# Patient Record
Sex: FEMALE | Race: WHITE | NOT HISPANIC OR LATINO | Employment: FULL TIME | URBAN - METROPOLITAN AREA
[De-identification: names, ages, dates, MRNs, and addresses within clinical notes are randomized per-mention and may not be internally consistent; named-entity substitution may affect disease eponyms.]

---

## 2017-02-06 DIAGNOSIS — E03.9 HYPOTHYROIDISM, UNSPECIFIED TYPE: Primary | ICD-10-CM

## 2017-02-06 DIAGNOSIS — E78.49 OTHER HYPERLIPIDEMIA: ICD-10-CM

## 2017-02-06 LAB
ALBUMIN SERPL-MCNC: 4.7 G/DL (ref 3.5–5.2)
ALBUMIN/GLOB SERPL: 1.6 G/DL
ALP SERPL-CCNC: 97 U/L (ref 39–117)
ALT SERPL-CCNC: 19 U/L (ref 1–33)
AST SERPL-CCNC: 16 U/L (ref 1–32)
BILIRUB SERPL-MCNC: 0.3 MG/DL (ref 0.1–1.2)
BUN SERPL-MCNC: 11 MG/DL (ref 6–20)
BUN/CREAT SERPL: 13.8 (ref 7–25)
CALCIUM SERPL-MCNC: 9.6 MG/DL (ref 8.6–10.5)
CHLORIDE SERPL-SCNC: 101 MMOL/L (ref 98–107)
CHOLEST SERPL-MCNC: 218 MG/DL (ref 0–200)
CO2 SERPL-SCNC: 24.7 MMOL/L (ref 22–29)
CREAT SERPL-MCNC: 0.8 MG/DL (ref 0.57–1)
GLOBULIN SER CALC-MCNC: 3 GM/DL
GLUCOSE SERPL-MCNC: 99 MG/DL (ref 65–99)
HDLC SERPL-MCNC: 73 MG/DL (ref 40–60)
LDLC SERPL CALC-MCNC: 128 MG/DL (ref 0–100)
LDLC/HDLC SERPL: 1.76 {RATIO}
POTASSIUM SERPL-SCNC: 4.9 MMOL/L (ref 3.5–5.2)
PROT SERPL-MCNC: 7.7 G/DL (ref 6–8.5)
SODIUM SERPL-SCNC: 140 MMOL/L (ref 136–145)
TRIGL SERPL-MCNC: 84 MG/DL (ref 0–150)
TSH SERPL DL<=0.005 MIU/L-ACNC: 3.06 MIU/ML (ref 0.27–4.2)
VLDLC SERPL CALC-MCNC: 16.8 MG/DL (ref 5–40)

## 2017-02-13 ENCOUNTER — OFFICE VISIT (OUTPATIENT)
Dept: INTERNAL MEDICINE | Facility: CLINIC | Age: 47
End: 2017-02-13

## 2017-02-13 VITALS
BODY MASS INDEX: 32.03 KG/M2 | HEART RATE: 81 BPM | WEIGHT: 204.1 LBS | HEIGHT: 67 IN | OXYGEN SATURATION: 98 % | SYSTOLIC BLOOD PRESSURE: 112 MMHG | DIASTOLIC BLOOD PRESSURE: 78 MMHG

## 2017-02-13 DIAGNOSIS — E03.9 HYPOTHYROIDISM, UNSPECIFIED TYPE: ICD-10-CM

## 2017-02-13 DIAGNOSIS — Z00.00 HEALTH CARE MAINTENANCE: Primary | ICD-10-CM

## 2017-02-13 PROCEDURE — 93000 ELECTROCARDIOGRAM COMPLETE: CPT | Performed by: INTERNAL MEDICINE

## 2017-02-13 PROCEDURE — 99396 PREV VISIT EST AGE 40-64: CPT | Performed by: INTERNAL MEDICINE

## 2017-02-13 RX ORDER — LEVOTHYROXINE SODIUM 50 MCG
50 TABLET ORAL DAILY
Qty: 30 TABLET | Refills: 11 | Status: SHIPPED | OUTPATIENT
Start: 2017-02-13 | End: 2017-08-21 | Stop reason: SDUPTHER

## 2017-02-13 RX ORDER — ESCITALOPRAM OXALATE 10 MG/1
10 TABLET ORAL DAILY
Qty: 30 TABLET | Refills: 11 | Status: SHIPPED | OUTPATIENT
Start: 2017-02-13 | End: 2018-02-27

## 2017-02-13 NOTE — PROGRESS NOTES
Subjective   Rita Kemp is a 46 y.o. female and is here for a comprehensive physical exam. The patient reports problems - recent bout of bronchitis.  She is better with the bronchitis.  SHe does not use a reg inhaler  She rarely needs her rescue  Pt has been doing well with thyroid meds.  Taking as perscribed without any complications    Pt is UTD with annual gyn exam and mammo she folows with gyn    Do you take any herbs or supplements that were not prescribed by a doctor? Mvi, fish oil, biotin  Are you taking calcium supplements? Vit d    Social History: she does eat a healthy diet  She does exercise    Social History     Social History   • Marital status: Unknown     Spouse name: Suhas Kemp   • Number of children: 0   • Years of education: N/A     Occupational History   •  HR      Social History Main Topics   • Smoking status: Never Smoker   • Smokeless tobacco: Never Used   • Alcohol use 1.2 oz/week     2 Glasses of wine per week   • Drug use: No   • Sexual activity: Not on file     Other Topics Concern   • Not on file     Social History Narrative       Family History:   Family History   Problem Relation Age of Onset   • Coronary artery disease Mother    • Macular degeneration Mother    • Thyroid disease Mother    • Heart disease Mother    • Skin cancer Mother    • Coronary artery disease Father    • Hypertension Father    • Diabetes type II Father    • Heart disease Father    • Diabetes Maternal Grandmother    • Skin cancer Sister    • Alcohol abuse Sister      both sisters with hx of alcohol abuse   • Emphysema Brother         • Alcohol abuse Brother        Past Medical History:   Past Medical History   Diagnosis Date   • Anxiety    • Asthma    • Depression    • Diverticulitis    • History of normal mammogram 2015           Review of Systems    A comprehensive review of systems was negative.    Objective   Visit Vitals   • /78 (BP Location: Left arm, Patient  "Position: Sitting)   • Pulse 81   • Ht 66.5\" (168.9 cm)   • Wt 204 lb 1.6 oz (92.6 kg)   • SpO2 98%   • BMI 32.45 kg/m2       General Appearance:    Alert, cooperative, no distress, appears stated age   Head:    Normocephalic, without obvious abnormality, atraumatic   Eyes:    PERRL, conjunctiva/corneas clear, EOM's intact, fundi     benign, both eyes   Ears:    Normal TM's and external ear canals, both ears   Nose:   Nares normal, septum midline, mucosa normal, no drainage    or sinus tenderness   Throat:   Lips, mucosa, and tongue normal; teeth and gums normal   Neck:   Supple, symmetrical, trachea midline, no adenopathy;     thyroid:  no enlargement/tenderness/nodules; no carotid    bruit or JVD   Back:     Symmetric, no curvature, ROM normal, no CVA tenderness   Lungs:     Clear to auscultation bilaterally, respirations unlabored   Chest Wall:    No tenderness or deformity    Heart:    Regular rate and rhythm, S1 and S2 normal, no murmur, rub   or gallop       Abdomen:     Soft, non-tender, bowel sounds active all four quadrants,     no masses, no organomegaly           Extremities:   Extremities normal, atraumatic, no cyanosis or edema   Pulses:   2+ and symmetric all extremities   Skin:   Skin color, texture, turgor normal, no rashes or lesions   Lymph nodes:   Cervical, supraclavicular, and axillary nodes normal   Neurologic:   CNII-XII intact, normal strength, sensation and reflexes     throughout       Medications:   Current Outpatient Prescriptions:   •  albuterol (PROVENTIL HFA;VENTOLIN HFA) 108 (90 BASE) MCG/ACT inhaler, Inhale 2 puffs Every 4 (Four) Hours As Needed for wheezing., Disp: 6.7 g, Rfl: 0  •  ALPRAZolam (XANAX) 0.5 MG tablet, Take 0.5 mg by mouth 3 (three) times a day as needed.  , Disp: , Rfl:   •  escitalopram (LEXAPRO) 10 MG tablet, Take 1 tablet by mouth daily., Disp: , Rfl: 12  •  ESTROGEL 0.75 MG/1.25 GM (0.06%) topical gel, Apply 1 application topically daily., Disp: , Rfl: 1  •  " Multiple Vitamin (MULTI-VITAMIN) tablet, Take by mouth., Disp: , Rfl:   •  SYNTHROID 50 MCG tablet, TAKE 1 TABLET BY MOUTH EVERY DAY, Disp: 30 tablet, Rfl: 5     EKG shows normal sinus rhythm without any acute ST changes.  There is no baseline available for comparison.  She does have low voltage and some of the precordial leads likely related to breast tissue    Assessment/Plan   Healthy female exam.      1. Healthcare Maintenance:  Sees gyn  2. Patient Counseling:  --Nutrition: Stressed importance of moderation in sodium/caffeine intake, saturated fat and cholesterol, caloric balance, sufficient intake of fresh fruits, vegetables, fiber, calcium and vit D  --Exercise: Stressed the importance of regular exercise.   --Substance Abuse: Discussed cessation/primary prevention of tobacco, alcohol, or other drug use; driving or other dangerous activities under the influence; availability of treatment for abuse.   --Dental health: Discussed importance of regular tooth brushing, flossing, and dental visits.  --Immunizations reviewed.  --Discussed benefits of screening colonoscopy.  3. Hypothyroidism- she is stable on current

## 2017-02-17 ENCOUNTER — TELEPHONE (OUTPATIENT)
Dept: INTERNAL MEDICINE | Facility: CLINIC | Age: 47
End: 2017-02-17

## 2017-02-17 NOTE — TELEPHONE ENCOUNTER
I LEFT A MESSAGE THAT I COULDN'T LOCATE [facility] HERE IN THE OFFICE JUST FOR NAME BRAND SYNTHROID BUT I FOUND [facility] ON [address] AND ADVISED PT. TO GET ON THAT AND PRINT THOSE OFF.

## 2017-02-17 NOTE — TELEPHONE ENCOUNTER
----- Message from Lidia Bean sent at 2/14/2017 12:27 PM EST -----  Pt said Dr Pennington mentioned that we had a coupon card for 25.00 off of synthroid. Do we still have them and if so call her back and let her know. She works across the street.  Work #[phone]

## 2017-05-23 ENCOUNTER — TELEPHONE (OUTPATIENT)
Dept: INTERNAL MEDICINE | Facility: CLINIC | Age: 47
End: 2017-05-23

## 2017-06-09 ENCOUNTER — APPOINTMENT (OUTPATIENT)
Dept: WOMENS IMAGING | Facility: HOSPITAL | Age: 47
End: 2017-06-09

## 2017-06-09 PROCEDURE — 76641 ULTRASOUND BREAST COMPLETE: CPT | Performed by: RADIOLOGY

## 2017-06-09 PROCEDURE — 77062 BREAST TOMOSYNTHESIS BI: CPT | Performed by: RADIOLOGY

## 2017-06-09 PROCEDURE — G0204 DX MAMMO INCL CAD BI: HCPCS | Performed by: RADIOLOGY

## 2017-06-09 PROCEDURE — G0279 TOMOSYNTHESIS, MAMMO: HCPCS | Performed by: RADIOLOGY

## 2017-06-09 PROCEDURE — 77066 DX MAMMO INCL CAD BI: CPT | Performed by: RADIOLOGY

## 2017-08-21 ENCOUNTER — TELEPHONE (OUTPATIENT)
Dept: INTERNAL MEDICINE | Facility: CLINIC | Age: 47
End: 2017-08-21

## 2017-08-21 RX ORDER — LEVOTHYROXINE SODIUM 50 MCG
50 TABLET ORAL DAILY
Qty: 30 TABLET | Refills: 5 | Status: SHIPPED | OUTPATIENT
Start: 2017-08-21 | End: 2018-02-27 | Stop reason: SDUPTHER

## 2017-08-21 NOTE — TELEPHONE ENCOUNTER
RX SENT TO PHARMACY    ----- Message from Lidia Bean sent at 8/21/2017  9:15 AM EDT -----  Pt needs refill RX sent to [facility] for her Synthroid 50 mcg

## 2017-12-21 ENCOUNTER — OFFICE VISIT (OUTPATIENT)
Dept: INTERNAL MEDICINE | Facility: CLINIC | Age: 47
End: 2017-12-21

## 2017-12-21 VITALS
WEIGHT: 181.44 LBS | SYSTOLIC BLOOD PRESSURE: 100 MMHG | BODY MASS INDEX: 28.48 KG/M2 | HEART RATE: 62 BPM | HEIGHT: 67 IN | TEMPERATURE: 98.2 F | DIASTOLIC BLOOD PRESSURE: 68 MMHG | OXYGEN SATURATION: 97 %

## 2017-12-21 DIAGNOSIS — J40 BRONCHITIS: Primary | ICD-10-CM

## 2017-12-21 PROCEDURE — 99213 OFFICE O/P EST LOW 20 MIN: CPT | Performed by: NURSE PRACTITIONER

## 2017-12-21 RX ORDER — AMOXICILLIN 875 MG/1
875 TABLET, COATED ORAL EVERY 12 HOURS SCHEDULED
Qty: 20 TABLET | Refills: 0 | Status: SHIPPED | OUTPATIENT
Start: 2017-12-21 | End: 2018-02-27

## 2017-12-21 RX ORDER — ALBUTEROL SULFATE 90 UG/1
2 AEROSOL, METERED RESPIRATORY (INHALATION) EVERY 4 HOURS PRN
Qty: 6.7 G | Refills: 1 | Status: SHIPPED | OUTPATIENT
Start: 2017-12-21

## 2017-12-21 NOTE — PROGRESS NOTES
Subjective   Rita Kemp is a 47 y.o. female. Patient is here today for   Chief Complaint   Patient presents with   • URI     Pt complains of having a productive cough, yellow nasal/throat sputum & chest heaviness since she has asthma. x2 days. Pt has been using her albuterol inhaler & mucinex.    .    History of Present Illness   C/o cough associated with chest congestion. Cough is productive at times. Denies sore throat, fever. She has tried Mucinex and albuterol inhaler with minimal relief. She is feeling worse today. Patient has a history of asthma.     The following portions of the patient's history were reviewed and updated as appropriate: allergies, current medications, past family history, past medical history, past social history, past surgical history and problem list.    Review of Systems   Constitutional: Negative.    HENT: Positive for congestion. Negative for sinus pain, sinus pressure and sore throat.    Respiratory: Positive for cough and shortness of breath. Negative for wheezing.    Cardiovascular: Negative.        Objective   Vitals:    12/21/17 1107   BP: 100/68   Pulse: 62   Temp: 98.2 °F (36.8 °C)   SpO2: 97%     Physical Exam   Constitutional: Vital signs are normal. She appears well-developed and well-nourished.   HENT:   Right Ear: Tympanic membrane and ear canal normal.   Left Ear: Tympanic membrane is bulging. A middle ear effusion is present.   Mouth/Throat: Oropharynx is clear and moist and mucous membranes are normal.   Cardiovascular: Normal rate, regular rhythm and normal heart sounds.    Pulmonary/Chest: Effort normal and breath sounds normal.   Lymphadenopathy:     She has no cervical adenopathy.   Skin: Skin is warm, dry and intact.   Psychiatric: She has a normal mood and affect. Her speech is normal and behavior is normal. Thought content normal.   Nursing note and vitals reviewed.      Assessment/Plan   Rita was seen today for uri.    Diagnoses and all orders for this  visit:    Bronchitis  -     albuterol (PROVENTIL HFA;VENTOLIN HFA) 108 (90 Base) MCG/ACT inhaler; Inhale 2 puffs Every 4 (Four) Hours As Needed for Wheezing.  -     amoxicillin (AMOXIL) 875 MG tablet; Take 1 tablet by mouth Every 12 (Twelve) Hours.  -     HYDROcod Polst-CPM Polst ER (TUSSIONEX PENNKINETIC ER) 10-8 MG/5ML ER suspension; Take 5 mL by mouth Every 12 (Twelve) Hours As Needed for Cough.

## 2018-02-13 ENCOUNTER — RESULTS ENCOUNTER (OUTPATIENT)
Dept: INTERNAL MEDICINE | Facility: CLINIC | Age: 48
End: 2018-02-13

## 2018-02-13 DIAGNOSIS — E03.9 HYPOTHYROIDISM, UNSPECIFIED TYPE: ICD-10-CM

## 2018-02-13 DIAGNOSIS — Z00.00 HEALTH CARE MAINTENANCE: ICD-10-CM

## 2018-02-22 LAB
ALBUMIN SERPL-MCNC: 4.7 G/DL (ref 3.5–5.2)
ALBUMIN/GLOB SERPL: 1.7 G/DL
ALP SERPL-CCNC: 85 U/L (ref 39–117)
ALT SERPL-CCNC: 9 U/L (ref 1–33)
AST SERPL-CCNC: 13 U/L (ref 1–32)
BILIRUB SERPL-MCNC: 0.4 MG/DL (ref 0.1–1.2)
BUN SERPL-MCNC: 10 MG/DL (ref 6–20)
BUN/CREAT SERPL: 12.5 (ref 7–25)
CALCIUM SERPL-MCNC: 9.5 MG/DL (ref 8.6–10.5)
CHLORIDE SERPL-SCNC: 102 MMOL/L (ref 98–107)
CHOLEST SERPL-MCNC: 218 MG/DL (ref 0–200)
CO2 SERPL-SCNC: 28.4 MMOL/L (ref 22–29)
CREAT SERPL-MCNC: 0.8 MG/DL (ref 0.57–1)
GFR SERPLBLD CREATININE-BSD FMLA CKD-EPI: 76 ML/MIN/1.73
GFR SERPLBLD CREATININE-BSD FMLA CKD-EPI: 92 ML/MIN/1.73
GLOBULIN SER CALC-MCNC: 2.7 GM/DL
GLUCOSE SERPL-MCNC: 82 MG/DL (ref 65–99)
HDLC SERPL-MCNC: 81 MG/DL (ref 40–60)
LDLC SERPL CALC-MCNC: 112 MG/DL (ref 0–100)
LDLC/HDLC SERPL: 1.38 {RATIO}
POTASSIUM SERPL-SCNC: 4.5 MMOL/L (ref 3.5–5.2)
PROT SERPL-MCNC: 7.4 G/DL (ref 6–8.5)
SODIUM SERPL-SCNC: 138 MMOL/L (ref 136–145)
TRIGL SERPL-MCNC: 127 MG/DL (ref 0–150)
TSH SERPL DL<=0.005 MIU/L-ACNC: 2.33 MIU/ML (ref 0.27–4.2)
VLDLC SERPL CALC-MCNC: 25.4 MG/DL (ref 5–40)

## 2018-02-27 ENCOUNTER — OFFICE VISIT (OUTPATIENT)
Dept: INTERNAL MEDICINE | Facility: CLINIC | Age: 48
End: 2018-02-27

## 2018-02-27 VITALS
WEIGHT: 177 LBS | HEIGHT: 67 IN | BODY MASS INDEX: 27.78 KG/M2 | OXYGEN SATURATION: 98 % | HEART RATE: 64 BPM | TEMPERATURE: 98 F | SYSTOLIC BLOOD PRESSURE: 100 MMHG | DIASTOLIC BLOOD PRESSURE: 70 MMHG

## 2018-02-27 DIAGNOSIS — Z12.11 SCREENING FOR COLON CANCER: ICD-10-CM

## 2018-02-27 DIAGNOSIS — E03.9 HYPOTHYROIDISM, UNSPECIFIED TYPE: ICD-10-CM

## 2018-02-27 DIAGNOSIS — Z00.00 HEALTH CARE MAINTENANCE: Primary | ICD-10-CM

## 2018-02-27 PROCEDURE — 99396 PREV VISIT EST AGE 40-64: CPT | Performed by: INTERNAL MEDICINE

## 2018-02-27 RX ORDER — LEVOTHYROXINE SODIUM 50 MCG
50 TABLET ORAL DAILY
Qty: 30 TABLET | Refills: 11 | Status: SHIPPED | OUTPATIENT
Start: 2018-02-27 | End: 2019-02-28

## 2018-02-27 RX ORDER — ZOLPIDEM TARTRATE 10 MG/1
10 TABLET ORAL NIGHTLY PRN
Qty: 15 TABLET | Refills: 0 | Status: SHIPPED | OUTPATIENT
Start: 2018-02-27 | End: 2019-02-28

## 2018-02-27 NOTE — PROGRESS NOTES
"Subjective   Rita Kemp is a 47 y.o. female and is here for a comprehensive physical exam. The patient reports problems - hypothyroidism.  Pt has been doing well with thyroid meds.  Taking as perscribed without any complications    Pt is UTD with annual gyn exam and mammo she sees the gyn    Do you take any herbs or supplements that were not prescribed by a doctor?       Social History: She is going on a cruise in [place]  Social History     Social History   • Marital status: Unknown     Spouse name: Suhas Kemp   • Number of children: 0   • Years of education: N/A     Occupational History   •  HR      Social History Main Topics   • Smoking status: Never Smoker   • Smokeless tobacco: Never Used   • Alcohol use 1.2 oz/week     2 Glasses of wine per week   • Drug use: No   • Sexual activity: Not on file     Other Topics Concern   • Not on file     Social History Narrative       Family History:   Family History   Problem Relation Age of Onset   • Coronary artery disease Mother    • Macular degeneration Mother    • Thyroid disease Mother    • Heart disease Mother    • Skin cancer Mother    • Coronary artery disease Father    • Hypertension Father    • Diabetes type II Father    • Heart disease Father    • Diabetes Maternal Grandmother    • Skin cancer Sister    • Alcohol abuse Sister      both sisters with hx of alcohol abuse   • Emphysema Brother         • Alcohol abuse Brother        Past Medical History:   Past Medical History:   Diagnosis Date   • Anxiety    • Asthma    • Depression    • Diverticulitis    • History of normal mammogram 2015           Review of Systems    A comprehensive review of systems was negative.    Objective   /70 (BP Location: Left arm, Patient Position: Sitting, Cuff Size: Adult)  Pulse 64  Temp 98 °F (36.7 °C) (Oral)   Ht 168.9 cm (66.5\")  Wt 80.3 kg (177 lb)  SpO2 98%  BMI 28.14 kg/m2    General Appearance:    Alert, cooperative, no distress, " appears stated age   Head:    Normocephalic, without obvious abnormality, atraumatic   Eyes:    PERRL, conjunctiva/corneas clear, EOM's intact, fundi     benign, both eyes   Ears:    Normal TM's and external ear canals, both ears   Nose:   Nares normal, septum midline, mucosa normal, no drainage    or sinus tenderness   Throat:   Lips, mucosa, and tongue normal; teeth and gums normal   Neck:   Supple, symmetrical, trachea midline, no adenopathy;     thyroid:  no enlargement/tenderness/nodules; no carotid    bruit or JVD   Back:     Symmetric, no curvature, ROM normal, no CVA tenderness   Lungs:     Clear to auscultation bilaterally, respirations unlabored   Chest Wall:    No tenderness or deformity    Heart:    Regular rate and rhythm, S1 and S2 normal, no murmur, rub   or gallop       Abdomen:     Soft, non-tender, bowel sounds active all four quadrants,     no masses, no organomegaly           Extremities:   Extremities normal, atraumatic, no cyanosis or edema   Pulses:   2+ and symmetric all extremities   Skin:   Skin color, texture, turgor normal, no rashes or lesions   Lymph nodes:   Cervical, supraclavicular, and axillary nodes normal   Neurologic:   CNII-XII intact, normal strength, sensation and reflexes     throughout       Medications:   Current Outpatient Prescriptions:   •  albuterol (PROVENTIL HFA;VENTOLIN HFA) 108 (90 Base) MCG/ACT inhaler, Inhale 2 puffs Every 4 (Four) Hours As Needed for Wheezing., Disp: 6.7 g, Rfl: 1  •  ALPRAZolam (XANAX) 0.5 MG tablet, Take 0.5 mg by mouth 3 (three) times a day as needed.  , Disp: , Rfl:   •  ESTROGEL 0.75 MG/1.25 GM (0.06%) topical gel, Apply 1 application topically daily., Disp: , Rfl: 1  •  Multiple Vitamin (MULTI-VITAMIN) tablet, Take by mouth., Disp: , Rfl:   •  SYNTHROID 50 MCG tablet, Take 1 tablet by mouth Daily., Disp: 30 tablet, Rfl: 5       Assessment/Plan   Healthy female exam.      1. Healthcare Maintenance:  2. Patient Counseling:  --Nutrition:  Stressed importance of moderation in sodium/caffeine intake, saturated fat and cholesterol, caloric balance, sufficient intake of fresh fruits, vegetables, fiber, calcium and vit D  --Exercise: she does exercise reg and she needs to cont this  --Substance Abuse: no tob ;limited etoh  --Dental health: she does go to the dentist  --Immunizations reviewed.  --Discussed benefits of screening colonoscopy she is due a colon  3. Hypothyroidism-  She does well with current dose  4.  She is going on a trip to [place] and needs something for sleep

## 2018-04-04 ENCOUNTER — PREP FOR SURGERY (OUTPATIENT)
Dept: OTHER | Facility: HOSPITAL | Age: 48
End: 2018-04-04

## 2018-04-04 DIAGNOSIS — Z83.71 FH: COLON POLYPS: Primary | ICD-10-CM

## 2018-05-02 ENCOUNTER — OFFICE VISIT (OUTPATIENT)
Dept: GASTROENTEROLOGY | Facility: CLINIC | Age: 48
End: 2018-05-02

## 2018-05-02 VITALS
TEMPERATURE: 98.5 F | HEIGHT: 66 IN | SYSTOLIC BLOOD PRESSURE: 112 MMHG | WEIGHT: 184 LBS | BODY MASS INDEX: 29.57 KG/M2 | DIASTOLIC BLOOD PRESSURE: 64 MMHG

## 2018-05-02 DIAGNOSIS — Z12.11 ENCOUNTER FOR SCREENING FOR MALIGNANT NEOPLASM OF COLON: Primary | ICD-10-CM

## 2018-05-02 DIAGNOSIS — Z87.19 HISTORY OF DIVERTICULITIS: ICD-10-CM

## 2018-05-02 PROCEDURE — 99203 OFFICE O/P NEW LOW 30 MIN: CPT | Performed by: INTERNAL MEDICINE

## 2018-05-02 RX ORDER — ESTRADIOL 2 MG/1
2 TABLET ORAL DAILY
Refills: 12 | COMMUNITY
Start: 2018-04-19

## 2018-05-02 NOTE — PROGRESS NOTES
Chief Complaint   Patient presents with   • discuss colonoscopy re hx of diverticulitis     Rita Kmep is a 48 y.o. female who presents with history of recurrent diverticulitis.  First episode 2006 - had normal c/s thereafter.  She has had several episodes thereafter.  She has been doing better in recent years - exercising, gluten and sugar free and avoids caffeine and she has not had a bout since.     The pain is awful when it occurs and is in her llq.    She was constipated but this has also improved with diet and exercise and she takes a probiotic.    HPI  Past Medical History:   Diagnosis Date   • Anxiety    • Asthma    • Depression    • Diverticulitis    • History of normal mammogram 07/2015     Past Surgical History:   Procedure Laterality Date   • COLONOSCOPY  10/24/2006    diverticulitis   [facility]   • HYSTERECTOMY  2009   • LEG SURGERY         Current Outpatient Prescriptions:   •  albuterol (PROVENTIL HFA;VENTOLIN HFA) 108 (90 Base) MCG/ACT inhaler, Inhale 2 puffs Every 4 (Four) Hours As Needed for Wheezing., Disp: 6.7 g, Rfl: 1  •  ALPRAZolam (XANAX) 0.5 MG tablet, Take 0.5 mg by mouth 3 (three) times a day as needed.  , Disp: , Rfl:   •  estradiol (ESTRACE) 2 MG tablet, Take 2 mg by mouth Daily., Disp: , Rfl: 12  •  Multiple Vitamin (MULTI-VITAMIN) tablet, Take by mouth., Disp: , Rfl:   •  SYNTHROID 50 MCG tablet, Take 1 tablet by mouth Daily., Disp: 30 tablet, Rfl: 11  •  ESTROGEL 0.75 MG/1.25 GM (0.06%) topical gel, Apply 1 application topically daily., Disp: , Rfl: 1  •  zolpidem (AMBIEN) 10 MG tablet, Take 1 tablet by mouth At Night As Needed for Sleep., Disp: 15 tablet, Rfl: 0  No Known Allergies  Social History     Social History   • Marital status: Unknown     Spouse name: Suhas Kemp   • Number of children: 0   • Years of education: N/A     Occupational History   •  HR      Social History Main Topics   • Smoking status: Never Smoker   • Smokeless tobacco: Never  Used   • Alcohol use 1.2 oz/week     2 Glasses of wine per week   • Drug use: No   • Sexual activity: Not on file     Other Topics Concern   • Not on file     Social History Narrative   • No narrative on file     Family History   Problem Relation Age of Onset   • Coronary artery disease Mother    • Macular degeneration Mother    • Thyroid disease Mother    • Heart disease Mother    • Skin cancer Mother    • Coronary artery disease Father    • Hypertension Father    • Diabetes type II Father    • Heart disease Father    • Diabetes Maternal Grandmother    • Skin cancer Sister    • Alcohol abuse Sister      both sisters with hx of alcohol abuse   • Emphysema Brother         • Alcohol abuse Brother      Review of Systems   Constitutional: Negative for appetite change and unexpected weight change.   Gastrointestinal: Negative for abdominal pain, blood in stool and constipation.   All other systems reviewed and are negative.    Vitals:    18 1510   BP: 112/64   Temp: 98.5 °F (36.9 °C)     1    18  1510   Weight: 83.5 kg (184 lb)     Physical Exam   Constitutional: She appears well-developed and well-nourished.   HENT:   Head: Normocephalic and atraumatic.   Eyes: No scleral icterus.   Abdominal: Soft. She exhibits no distension and no mass. There is no tenderness.   Neurological: She is alert.   Skin: Skin is warm and dry.   Psychiatric: She has a normal mood and affect.     No images are attached to the encounter.  No notes on file  Rita was seen today for discuss colonoscopy re hx of diverticulitis.    Diagnoses and all orders for this visit:    Encounter for screening for malignant neoplasm of colon  -     Case Request; Standing  -     Case Request    History of diverticulitis    Other orders  -     Implement Anesthesia orders day of procedure.; Standing  -     Obtain informed consent; Standing  -     Verify bowel prep was successful; Standing  -     Give tap water enema if bowel prep was  insufficient; Standing             Plan:  - fiber supplement daily  - continue diet modification and exercise  - due for screening c/s

## 2018-07-30 ENCOUNTER — APPOINTMENT (OUTPATIENT)
Dept: WOMENS IMAGING | Facility: HOSPITAL | Age: 48
End: 2018-07-30

## 2018-07-30 PROCEDURE — 77067 SCR MAMMO BI INCL CAD: CPT | Performed by: RADIOLOGY

## 2018-07-30 PROCEDURE — 77063 BREAST TOMOSYNTHESIS BI: CPT | Performed by: RADIOLOGY

## 2018-08-06 ENCOUNTER — ANESTHESIA EVENT (OUTPATIENT)
Dept: GASTROENTEROLOGY | Facility: HOSPITAL | Age: 48
End: 2018-08-06

## 2018-08-06 ENCOUNTER — HOSPITAL ENCOUNTER (OUTPATIENT)
Facility: HOSPITAL | Age: 48
Setting detail: HOSPITAL OUTPATIENT SURGERY
Discharge: HOME | End: 2018-08-06
Attending: INTERNAL MEDICINE | Admitting: INTERNAL MEDICINE

## 2018-08-06 ENCOUNTER — ANESTHESIA (OUTPATIENT)
Dept: GASTROENTEROLOGY | Facility: HOSPITAL | Age: 48
End: 2018-08-06

## 2018-08-06 VITALS
WEIGHT: 180.13 LBS | BODY MASS INDEX: 28.95 KG/M2 | SYSTOLIC BLOOD PRESSURE: 106 MMHG | RESPIRATION RATE: 16 BRPM | DIASTOLIC BLOOD PRESSURE: 52 MMHG | OXYGEN SATURATION: 98 % | TEMPERATURE: 97.8 F | HEART RATE: 57 BPM | HEIGHT: 66 IN

## 2018-08-06 PROCEDURE — 25010000002 PROPOFOL 10 MG/ML EMULSION: Performed by: NURSE ANESTHETIST, CERTIFIED REGISTERED

## 2018-08-06 PROCEDURE — 45378 DIAGNOSTIC COLONOSCOPY: CPT | Performed by: INTERNAL MEDICINE

## 2018-08-06 PROCEDURE — S0260 H&P FOR SURGERY: HCPCS | Performed by: INTERNAL MEDICINE

## 2018-08-06 RX ORDER — SODIUM CHLORIDE, SODIUM LACTATE, POTASSIUM CHLORIDE, CALCIUM CHLORIDE 600; 310; 30; 20 MG/100ML; MG/100ML; MG/100ML; MG/100ML
30 INJECTION, SOLUTION INTRAVENOUS CONTINUOUS PRN
Status: DISCONTINUED | OUTPATIENT
Start: 2018-08-06 | End: 2018-08-06 | Stop reason: HOSPADM

## 2018-08-06 RX ORDER — PROPOFOL 10 MG/ML
VIAL (ML) INTRAVENOUS CONTINUOUS PRN
Status: DISCONTINUED | OUTPATIENT
Start: 2018-08-06 | End: 2018-08-06 | Stop reason: SURG

## 2018-08-06 RX ORDER — LIDOCAINE HYDROCHLORIDE 20 MG/ML
INJECTION, SOLUTION INFILTRATION; PERINEURAL AS NEEDED
Status: DISCONTINUED | OUTPATIENT
Start: 2018-08-06 | End: 2018-08-06 | Stop reason: SURG

## 2018-08-06 RX ADMIN — LIDOCAINE HYDROCHLORIDE 100 MG: 20 INJECTION, SOLUTION INFILTRATION; PERINEURAL at 13:44

## 2018-08-06 RX ADMIN — SODIUM CHLORIDE, POTASSIUM CHLORIDE, SODIUM LACTATE AND CALCIUM CHLORIDE: 600; 310; 30; 20 INJECTION, SOLUTION INTRAVENOUS at 13:44

## 2018-08-06 RX ADMIN — SODIUM CHLORIDE, POTASSIUM CHLORIDE, SODIUM LACTATE AND CALCIUM CHLORIDE 30 ML/HR: 600; 310; 30; 20 INJECTION, SOLUTION INTRAVENOUS at 13:20

## 2018-08-06 RX ADMIN — PROPOFOL 300 MCG/KG/MIN: 10 INJECTION, EMULSION INTRAVENOUS at 13:44

## 2018-08-06 NOTE — H&P
[facility]  Pre Procedure History & Physical    Chief Complaint:   screening    Subjective     HPI:   Rita Kemp is a 48 y.o. female who presents with history of recurrent diverticulitis.  First episode  - had normal c/s thereafter.  She has had several episodes thereafter.  She has been doing better in recent years - exercising, gluten and sugar free and avoids caffeine and she has not had a bout since.      The pain is awful when it occurs and is in her llq.     She was constipated but this has also improved with diet and exercise and she takes a probiotic.      Past Medical History:   Past Medical History:   Diagnosis Date   • Anxiety    • Asthma    • Depression    • Disease of thyroid gland    • Diverticulitis    • History of normal mammogram 2015       Past Surgical History:  Past Surgical History:   Procedure Laterality Date   • COLONOSCOPY  10/24/2006    diverticulitis   [facility]VAR[facility]   • HYSTERECTOMY     • LEG SURGERY         Family History:  Family History   Problem Relation Age of Onset   • Coronary artery disease Mother    • Macular degeneration Mother    • Thyroid disease Mother    • Heart disease Mother    • Skin cancer Mother    • Coronary artery disease Father    • Hypertension Father    • Diabetes type II Father    • Heart disease Father    • Diabetes Maternal Grandmother    • Skin cancer Sister    • Alcohol abuse Sister         both sisters with hx of alcohol abuse   • Emphysema Brother            • Alcohol abuse Brother        Social History:   reports that she has never smoked. She has never used smokeless tobacco. She reports that she drinks about 1.2 oz of alcohol per week . She reports that she does not use drugs.    Medications:   Prescriptions Prior to Admission   Medication Sig Dispense Refill Last Dose   • estradiol (ESTRACE) 2 MG tablet Take 2 mg by mouth Daily.  12 Past Week at Unknown time   • ESTROGEL 0.75 MG/1.25 GM (0.06%) topical  "gel Apply 1 application topically daily.  1 Past Week at Unknown time   • Multiple Vitamin (MULTI-VITAMIN) tablet Take by mouth.   Past Week at Unknown time   • SYNTHROID 50 MCG tablet Take 1 tablet by mouth Daily. 30 tablet 11 Past Week at Unknown time   • zolpidem (AMBIEN) 10 MG tablet Take 1 tablet by mouth At Night As Needed for Sleep. 15 tablet 0 Past Month at Unknown time   • albuterol (PROVENTIL HFA;VENTOLIN HFA) 108 (90 Base) MCG/ACT inhaler Inhale 2 puffs Every 4 (Four) Hours As Needed for Wheezing. 6.7 g 1 More than a month at Unknown time   • ALPRAZolam (XANAX) 0.5 MG tablet Take 0.5 mg by mouth 3 (three) times a day as needed.     More than a month at Unknown time       Allergies:  Patient has no known allergies.    ROS:    Pertinent items are noted in HPI, all other systems reviewed and negative     Objective     Blood pressure 131/74, pulse 71, temperature 97.7 °F (36.5 °C), temperature source Oral, resp. rate 15, height 167.6 cm (66\"), weight 81.7 kg (180 lb 2 oz), SpO2 98 %.    Physical Exam   Constitutional: Pt is oriented to person, place, and time and well-developed, well-nourished, and in no distress.   Mouth/Throat: Oropharynx is clear and moist.   Neck: Normal range of motion.   Cardiovascular: Normal rate, regular rhythm   Pulmonary/Chest: Effort normal   Abdominal: Soft. Nontender  Skin: Skin is warm and dry.   Psychiatric: Mood, memory, affect and judgment normal.     Assessment/Plan     Diagnosis:  screening    Anticipated Surgical Procedure:  colonoscopy    The risks, benefits, and alternatives of this procedure have been discussed with the patient or the responsible party- the patient understands and agrees to proceed.                                                            "

## 2018-08-06 NOTE — ANESTHESIA PREPROCEDURE EVALUATION
Anesthesia Evaluation     Patient summary reviewed and Nursing notes reviewed   NPO Solid Status: > 8 hours  NPO Liquid Status: > 8 hours           Airway   Mallampati: III  TM distance: >3 FB  Neck ROM: full  no difficulty expected  Dental - normal exam     Pulmonary - normal exam   (+) asthma,   (-) COPD, not a smoker, lung cancer  Cardiovascular - normal exam  Exercise tolerance: excellent (>7 METS)    Rhythm: regular  Rate: normal    (+) hyperlipidemia,   (-) hypertension, valvular problems/murmurs, past MI, CAD, cardiac stents, CABG      Neuro/Psych  (+) psychiatric history Depression and Anxiety,     (-) seizures, TIA, CVA  GI/Hepatic/Renal/Endo    (+) obesity, morbid obesity,    (-) hepatitis, liver disease, no renal disease, diabetes, hypothyroidism    Musculoskeletal (-) negative ROS    Abdominal  - normal exam   Substance History - negative use     OB/GYN negative ob/gyn ROS         Other - negative ROS                     Anesthesia Plan    ASA 2     MAC     intravenous induction   Anesthetic plan and risks discussed with patient and spouse/significant other.    Plan discussed with CRNA and attending.

## 2018-08-06 NOTE — ANESTHESIA POSTPROCEDURE EVALUATION
"Patient: Rita Kemp    Procedure Summary     Date:  08/06/18 Room / Location:   [facility]    Anesthesia Start:  1344 Anesthesia Stop:  1414    Procedure:  COLONOSCOPYto cecum and ti (N/A ) Diagnosis:       Encounter for screening for malignant neoplasm of colon      (Encounter for screening for malignant neoplasm of colon [Z12.11])    Surgeon:  Gisela Ward MD Provider:  Gregg Antony MD    Anesthesia Type:  MAC ASA Status:  2          Anesthesia Type: MAC  Last vitals  BP   131/74 (08/06/18 1310)   Temp   36.5 °C (97.7 °F) (08/06/18 1310)   Pulse   71 (08/06/18 1310)   Resp   15 (08/06/18 1310)     SpO2   98 % (08/06/18 1310)     Post Anesthesia Care and Evaluation    Patient location during evaluation: bedside  Patient participation: complete - patient participated  Level of consciousness: awake and alert  Pain score: 0  Pain management: adequate  Airway patency: patent  Anesthetic complications: No anesthetic complications    Cardiovascular status: acceptable  Respiratory status: acceptable  Hydration status: acceptable    Comments: /74 (BP Location: Left arm, Patient Position: Lying)   Pulse 71   Temp 36.5 °C (97.7 °F) (Oral)   Resp 15   Ht 167.6 cm (66\")   Wt 81.7 kg (180 lb 2 oz)   SpO2 98%   BMI 29.07 kg/m²       "

## 2018-08-27 ENCOUNTER — RESULTS ENCOUNTER (OUTPATIENT)
Dept: INTERNAL MEDICINE | Facility: CLINIC | Age: 48
End: 2018-08-27

## 2018-08-27 DIAGNOSIS — Z00.00 HEALTH CARE MAINTENANCE: ICD-10-CM

## 2019-02-22 LAB
ALBUMIN SERPL-MCNC: 4.5 G/DL (ref 3.5–5.5)
ALBUMIN/GLOB SERPL: 1.6 {RATIO} (ref 1.2–2.2)
ALP SERPL-CCNC: 69 IU/L (ref 39–117)
ALT SERPL-CCNC: 17 IU/L (ref 0–32)
AST SERPL-CCNC: 16 IU/L (ref 0–40)
BILIRUB SERPL-MCNC: 0.4 MG/DL (ref 0–1.2)
BUN SERPL-MCNC: 11 MG/DL (ref 6–24)
BUN/CREAT SERPL: 14 (ref 9–23)
CALCIUM SERPL-MCNC: 9.2 MG/DL (ref 8.7–10.2)
CHLORIDE SERPL-SCNC: 101 MMOL/L (ref 96–106)
CHOLEST SERPL-MCNC: 204 MG/DL (ref 100–199)
CO2 SERPL-SCNC: 23 MMOL/L (ref 20–29)
CREAT SERPL-MCNC: 0.78 MG/DL (ref 0.57–1)
GLOBULIN SER CALC-MCNC: 2.8 G/DL (ref 1.5–4.5)
GLUCOSE SERPL-MCNC: 88 MG/DL (ref 65–99)
HDLC SERPL-MCNC: 81 MG/DL
LDLC SERPL CALC-MCNC: 96 MG/DL (ref 0–99)
LDLC/HDLC SERPL: 1.2 RATIO (ref 0–3.2)
POTASSIUM SERPL-SCNC: 4.8 MMOL/L (ref 3.5–5.2)
PROT SERPL-MCNC: 7.3 G/DL (ref 6–8.5)
SODIUM SERPL-SCNC: 137 MMOL/L (ref 134–144)
TRIGL SERPL-MCNC: 133 MG/DL (ref 0–149)
TSH SERPL DL<=0.005 MIU/L-ACNC: 2.51 UIU/ML (ref 0.45–4.5)
VLDLC SERPL CALC-MCNC: 27 MG/DL (ref 5–40)

## 2019-02-28 ENCOUNTER — OFFICE VISIT (OUTPATIENT)
Dept: INTERNAL MEDICINE | Facility: CLINIC | Age: 49
End: 2019-02-28

## 2019-02-28 VITALS
HEIGHT: 66 IN | SYSTOLIC BLOOD PRESSURE: 102 MMHG | HEART RATE: 70 BPM | OXYGEN SATURATION: 98 % | DIASTOLIC BLOOD PRESSURE: 70 MMHG | WEIGHT: 193.2 LBS | TEMPERATURE: 98.1 F | BODY MASS INDEX: 31.05 KG/M2

## 2019-02-28 DIAGNOSIS — N76.0 ACUTE VAGINITIS: ICD-10-CM

## 2019-02-28 DIAGNOSIS — E78.5 HYPERLIPIDEMIA, UNSPECIFIED HYPERLIPIDEMIA TYPE: ICD-10-CM

## 2019-02-28 DIAGNOSIS — E03.9 HYPOTHYROIDISM, UNSPECIFIED TYPE: ICD-10-CM

## 2019-02-28 DIAGNOSIS — Z00.00 HEALTH CARE MAINTENANCE: Primary | ICD-10-CM

## 2019-02-28 PROCEDURE — 99396 PREV VISIT EST AGE 40-64: CPT | Performed by: INTERNAL MEDICINE

## 2019-02-28 RX ORDER — LEVOTHYROXINE SODIUM 50 UG/1
50 CAPSULE ORAL DAILY
Qty: 30 CAPSULE | Refills: 3 | Status: SHIPPED | OUTPATIENT
Start: 2019-02-28 | End: 2019-03-12 | Stop reason: ALTCHOICE

## 2019-02-28 RX ORDER — FLUCONAZOLE 150 MG/1
150 TABLET ORAL ONCE
Qty: 1 TABLET | Refills: 1 | Status: SHIPPED | OUTPATIENT
Start: 2019-02-28 | End: 2019-02-28

## 2019-02-28 NOTE — PROGRESS NOTES
Subjective   Rita Kemp is a 48 y.o. female and is here for a comprehensive physical exam. The patient reports problems - hypothyroidism.  Pt has been doing well with thyroid meds.  Taking as perscribed without any complications  She does wake up tied but does not snore.  She is tired a lot    She has been having a little bit of itching vag and white d/c      Pt is UTD with annual gyn exam and mammo s/p hysterectomy    Do you take any herbs or supplements that were not prescribed by a doctor? See list      Social History: she has had limited exercise due to some back pain that is getting better.    Social History     Socioeconomic History   • Marital status: Unknown     Spouse name: Suhas Kemp   • Number of children: 0   • Years of education: Not on file   • Highest education level: Not on file   Social Needs   • Financial resource strain: Not on file   • Food insecurity - worry: Not on file   • Food insecurity - inability: Not on file   • Transportation needs - medical: Not on file   • Transportation needs - non-medical: Not on file   Occupational History   • Occupation:  HR   Tobacco Use   • Smoking status: Never Smoker   • Smokeless tobacco: Never Used   Substance and Sexual Activity   • Alcohol use: Yes     Alcohol/week: 1.2 oz     Types: 2 Glasses of wine per week   • Drug use: No   • Sexual activity: Not on file   Other Topics Concern   • Not on file   Social History Narrative   • Not on file       Family History:   Family History   Problem Relation Age of Onset   • Coronary artery disease Mother    • Macular degeneration Mother    • Thyroid disease Mother    • Heart disease Mother    • Skin cancer Mother    • Coronary artery disease Father    • Hypertension Father    • Diabetes type II Father    • Heart disease Father    • Diabetes Maternal Grandmother    • Skin cancer Sister    • Alcohol abuse Sister         both sisters with hx of alcohol abuse   • Emphysema Brother           "  • Alcohol abuse Brother        Past Medical History:   Past Medical History:   Diagnosis Date   • Anxiety    • Asthma    • Depression    • Disease of thyroid gland    • Diverticulitis    • History of normal mammogram 07/2015           Review of Systems    A comprehensive review of systems was negative.    Objective   /70 (BP Location: Left arm, Patient Position: Sitting, Cuff Size: Adult)   Pulse 70   Temp 98.1 °F (36.7 °C) (Oral)   Ht 167.6 cm (66\")   Wt 87.6 kg (193 lb 3.2 oz)   SpO2 98%   BMI 31.18 kg/m²     General Appearance:    Alert, cooperative, no distress, appears stated age   Head:    Normocephalic, without obvious abnormality, atraumatic   Eyes:    PERRL, conjunctiva/corneas clear, EOM's intact, fundi     benign, both eyes   Ears:    Normal TM's and external ear canals, both ears   Nose:   Nares normal, septum midline, mucosa normal, no drainage    or sinus tenderness   Throat:   Lips, mucosa, and tongue normal; teeth and gums normal   Neck:   Supple, symmetrical, trachea midline, no adenopathy;     thyroid:  no enlargement/tenderness/nodules; no carotid    bruit or JVD   Back:     Symmetric, no curvature, ROM normal, no CVA tenderness   Lungs:     Clear to auscultation bilaterally, respirations unlabored   Chest Wall:    No tenderness or deformity    Heart:    Regular rate and rhythm, S1 and S2 normal, no murmur, rub   or gallop       Abdomen:     Soft, non-tender, bowel sounds active all four quadrants,     no masses, no organomegaly      - White adherent discharge to the vaginal wall       Extremities:   Extremities normal, atraumatic, no cyanosis or edema   Pulses:   2+ and symmetric all extremities   Skin:   Skin color, texture, turgor normal, no rashes or lesions   Lymph nodes:   Cervical, supraclavicular, and axillary nodes normal   Neurologic:   CNII-XII intact, normal strength, sensation and reflexes     throughout       Medications:   Current Outpatient Medications:   •  " ALPRAZolam (XANAX) 0.5 MG tablet, Take 0.5 mg by mouth 3 (three) times a day as needed.  , Disp: , Rfl:   •  Ascorbic Acid (VITAMIN C PO), Take  by mouth., Disp: , Rfl:   •  Cholecalciferol (VITAMIN D PO), Take 5,000 Units by mouth., Disp: , Rfl:   •  estradiol (ESTRACE) 2 MG tablet, Take 2 mg by mouth Daily., Disp: , Rfl: 12  •  Multiple Vitamin (MULTI-VITAMIN) tablet, Take by mouth., Disp: , Rfl:   •  Omega-3 Fatty Acids (OMEGA 3 PO), Take  by mouth., Disp: , Rfl:   •  Probiotic Product (PROBIOTIC PO), Take  by mouth., Disp: , Rfl:   •  SYNTHROID 50 MCG tablet, Take 1 tablet by mouth Daily., Disp: 30 tablet, Rfl: 11  •  albuterol (PROVENTIL HFA;VENTOLIN HFA) 108 (90 Base) MCG/ACT inhaler, Inhale 2 puffs Every 4 (Four) Hours As Needed for Wheezing., Disp: 6.7 g, Rfl: 1       Assessment/Plan   Healthy female exam.     1. Healthcare Maintenance:  2. Patient Counseling:  --Nutrition: Stressed importance of moderation in sodium/caffeine intake, saturated fat and cholesterol, caloric balance, sufficient intake of fresh fruits, vegetables, fiber, calcium and vit D  --Exercise: I have rec reg exercise  --Substance Abuse: no tob no etoh  --Dental health: she does go to the dentist   --Immunizations reviewed.i have rec shingles vaccine  --Discussed benefits of screening colonoscopy.  3. Hypothyroidism-  She is ok with current meds but she want to try tirosint   4. Vaginitis-  Likely yeast diflucan 150mg x1 and await cx

## 2019-03-02 LAB
A VAGINAE DNA VAG QL NAA+PROBE: NORMAL SCORE
BVAB2 DNA VAG QL NAA+PROBE: NORMAL SCORE
C ALBICANS DNA VAG QL NAA+PROBE: NEGATIVE
C GLABRATA DNA VAG QL NAA+PROBE: NEGATIVE
MEGA1 DNA VAG QL NAA+PROBE: NORMAL SCORE
T VAGINALIS RRNA SPEC QL NAA+PROBE: NEGATIVE

## 2019-03-04 PROBLEM — E03.9 ACQUIRED HYPOTHYROIDISM: Status: ACTIVE | Noted: 2019-03-04

## 2019-03-12 ENCOUNTER — TELEPHONE (OUTPATIENT)
Dept: INTERNAL MEDICINE | Facility: CLINIC | Age: 49
End: 2019-03-12

## 2019-03-12 RX ORDER — LEVOTHYROXINE SODIUM 50 MCG
50 TABLET ORAL DAILY
Qty: 90 TABLET | Refills: 3 | Status: SHIPPED | OUTPATIENT
Start: 2019-03-12 | End: 2020-02-17

## 2019-03-12 NOTE — TELEPHONE ENCOUNTER
PT AWARE. RX SENT TO PHARMACY      ----- Message from Ayana Pennington MD sent at 3/12/2019  7:56 AM EDT -----  Stay on the synthroid  We can refer to endo if she would like  Synthroid is T4 I dont prescribe T3 she would need to see endo for that   ----- Message -----  From: Yanet Garcia MA  Sent: 3/11/2019  11:13 AM  To: Ayana Pennington MD    Pt's insurance is not going to approve tirosint unless she has tried and failed all alternatives. Levothyroxine, synthroid, liothyronine, armour thyroid and nature throid. Pt did say she wouldn't mind taking a medication that helps the t3 and t4. If this is not an option she will just stay on the synthroid

## 2019-05-02 ENCOUNTER — OFFICE VISIT (OUTPATIENT)
Dept: INTERNAL MEDICINE | Facility: CLINIC | Age: 49
End: 2019-05-02

## 2019-05-02 VITALS
TEMPERATURE: 98.3 F | BODY MASS INDEX: 31.02 KG/M2 | DIASTOLIC BLOOD PRESSURE: 70 MMHG | SYSTOLIC BLOOD PRESSURE: 102 MMHG | WEIGHT: 193 LBS | HEART RATE: 68 BPM | OXYGEN SATURATION: 98 % | HEIGHT: 66 IN

## 2019-05-02 DIAGNOSIS — R07.89 CHEST HEAVINESS: Primary | ICD-10-CM

## 2019-05-02 DIAGNOSIS — E03.9 HYPOTHYROIDISM, UNSPECIFIED TYPE: ICD-10-CM

## 2019-05-02 DIAGNOSIS — J45.909 UNCOMPLICATED ASTHMA, UNSPECIFIED ASTHMA SEVERITY, UNSPECIFIED WHETHER PERSISTENT: Primary | ICD-10-CM

## 2019-05-02 PROCEDURE — 93000 ELECTROCARDIOGRAM COMPLETE: CPT | Performed by: INTERNAL MEDICINE

## 2019-05-02 PROCEDURE — 99214 OFFICE O/P EST MOD 30 MIN: CPT | Performed by: INTERNAL MEDICINE

## 2019-05-02 RX ORDER — BUDESONIDE AND FORMOTEROL FUMARATE DIHYDRATE 160; 4.5 UG/1; UG/1
2 AEROSOL RESPIRATORY (INHALATION)
Qty: 1 INHALER | Refills: 3 | Status: SHIPPED | OUTPATIENT
Start: 2019-05-02

## 2019-05-02 NOTE — PROGRESS NOTES
Subjective   Rita Kemp is a 49 y.o. female states woke up Tuesday with chest heaviness, tightness because of Asthma, SOB, HA, cough.    History of Present Illness   PT had an episode of CP on Tuesday.  With profound SOB.  Sx occurred suddenly.  Episode lasted all day and then resolved   She feels like it was from her asthma  She used her inhaler  Episode occurred at rest  No sig swelling.  She does have a slight cough and sneezing.  She says she is better but still feels like she cannot get a deep breath      The following portions of the patient's history were reviewed and updated as appropriate: allergies, current medications, past medical history, past social history and problem list.  No recent travel     Review of Systems   All other systems reviewed and are negative.      Objective   Physical Exam   Constitutional: She is oriented to person, place, and time. She appears well-developed and well-nourished.   HENT:   Head: Normocephalic and atraumatic.   Right Ear: External ear normal.   Left Ear: External ear normal.   Mouth/Throat: Oropharynx is clear and moist.   Eyes: Conjunctivae and EOM are normal. Pupils are equal, round, and reactive to light.   Neck: Normal range of motion. No tracheal deviation present. No thyromegaly present.   Cardiovascular: Normal rate, regular rhythm, normal heart sounds and intact distal pulses.   Pulmonary/Chest: Effort normal. She has wheezes (expe wheezing throughout).   Abdominal: Soft. Bowel sounds are normal. She exhibits no distension. There is no tenderness.   Musculoskeletal: Normal range of motion. She exhibits no edema or deformity.   Neurological: She is alert and oriented to person, place, and time.   Skin: Skin is warm and dry.   Psychiatric: She has a normal mood and affect. Her behavior is normal. Judgment and thought content normal.   Vitals reviewed.      Vitals:    05/02/19 0830   BP: 102/70   Pulse: 68   Temp: 98.3 °F (36.8 °C)   SpO2: 98%     Current  Outpatient Medications:   •  albuterol (PROVENTIL HFA;VENTOLIN HFA) 108 (90 Base) MCG/ACT inhaler, Inhale 2 puffs Every 4 (Four) Hours As Needed for Wheezing., Disp: 6.7 g, Rfl: 1  •  ALPRAZolam (XANAX) 0.5 MG tablet, Take 0.5 mg by mouth 3 (three) times a day as needed.  , Disp: , Rfl:   •  Ascorbic Acid (VITAMIN C PO), Take  by mouth., Disp: , Rfl:   •  Cholecalciferol (VITAMIN D PO), Take 5,000 Units by mouth., Disp: , Rfl:   •  estradiol (ESTRACE) 2 MG tablet, Take 2 mg by mouth Daily., Disp: , Rfl: 12  •  Multiple Vitamin (MULTI-VITAMIN) tablet, Take by mouth., Disp: , Rfl:   •  Omega-3 Fatty Acids (OMEGA 3 PO), Take  by mouth., Disp: , Rfl:   •  Probiotic Product (PROBIOTIC PO), Take  by mouth., Disp: , Rfl:   •  SYNTHROID 50 MCG tablet, Take 1 tablet by mouth Daily., Disp: 90 tablet, Rfl: 3    EKG: Normal sinus rhythm without any acute ST changes.  There is no significant change when compared to EKG done in 2017    Assessment/Plan   Diagnoses and all orders for this visit:    Uncomplicated asthma, unspecified asthma severity, unspecified whether persistent        1.  Asthma  Chest pain: I agree this is likely related to her asthma.  Her EKG is completely normal and if she truly had cardiac chest pain for more than 12 hours we should see something on her EKG.  She does have some expiratory wheezing now and with her history of asthma I think she would benefit from a steroid inhaler.  I really think this was brocnh spasm She has any further symptoms along this line she needs to let me know her head right in the ER.  2.  Hypothyroidism: Patient is still feeling fatigued and was unable to get the Tirosint because of her insurance.  She would really like to see what her T3 is.  We will check a level today

## 2019-05-03 LAB
FT4I SERPL CALC-MCNC: 2.6 (ref 1.2–4.9)
T3RU NFR SERPL: 24 % (ref 24–39)
T4 SERPL-MCNC: 10.9 UG/DL (ref 4.5–12)
TSH SERPL DL<=0.005 MIU/L-ACNC: 2.58 UIU/ML (ref 0.45–4.5)

## 2019-07-08 ENCOUNTER — HOSPITAL ENCOUNTER (OUTPATIENT)
Dept: CT IMAGING | Facility: HOSPITAL | Age: 49
Discharge: HOME | End: 2019-07-08
Admitting: NURSE PRACTITIONER

## 2019-07-08 ENCOUNTER — OFFICE VISIT (OUTPATIENT)
Dept: INTERNAL MEDICINE | Facility: CLINIC | Age: 49
End: 2019-07-08

## 2019-07-08 VITALS
HEIGHT: 66 IN | OXYGEN SATURATION: 98 % | BODY MASS INDEX: 31.02 KG/M2 | HEART RATE: 72 BPM | DIASTOLIC BLOOD PRESSURE: 56 MMHG | WEIGHT: 193 LBS | SYSTOLIC BLOOD PRESSURE: 86 MMHG

## 2019-07-08 DIAGNOSIS — K57.92 DIVERTICULITIS OF INTESTINE WITHOUT PERFORATION OR ABSCESS WITHOUT BLEEDING, UNSPECIFIED PART OF INTESTINAL TRACT: Primary | ICD-10-CM

## 2019-07-08 PROCEDURE — 82565 ASSAY OF CREATININE: CPT

## 2019-07-08 PROCEDURE — 74177 CT ABD & PELVIS W/CONTRAST: CPT

## 2019-07-08 PROCEDURE — 0 DIATRIZOATE MEGLUMINE & SODIUM PER 1 ML: Performed by: NURSE PRACTITIONER

## 2019-07-08 PROCEDURE — 99214 OFFICE O/P EST MOD 30 MIN: CPT | Performed by: NURSE PRACTITIONER

## 2019-07-08 PROCEDURE — 25010000002 IOPAMIDOL 61 % SOLUTION: Performed by: NURSE PRACTITIONER

## 2019-07-08 RX ORDER — CIPROFLOXACIN 500 MG/1
500 TABLET, FILM COATED ORAL 2 TIMES DAILY
Qty: 20 TABLET | Refills: 0 | Status: SHIPPED | OUTPATIENT
Start: 2019-07-08 | End: 2019-09-06

## 2019-07-08 RX ORDER — HYDROCODONE BITARTRATE AND ACETAMINOPHEN 7.5; 325 MG/1; MG/1
1 TABLET ORAL EVERY 8 HOURS PRN
Qty: 12 TABLET | Refills: 0 | Status: SHIPPED | OUTPATIENT
Start: 2019-07-08 | End: 2019-09-06

## 2019-07-08 RX ORDER — METRONIDAZOLE 500 MG/1
500 TABLET ORAL 3 TIMES DAILY
Qty: 30 TABLET | Refills: 0 | Status: SHIPPED | OUTPATIENT
Start: 2019-07-08 | End: 2019-09-06

## 2019-07-08 RX ADMIN — IOPAMIDOL 85 ML: 612 INJECTION, SOLUTION INTRAVENOUS at 12:45

## 2019-07-08 RX ADMIN — DIATRIZOATE MEGLUMINE AND DIATRIZOATE SODIUM 30 ML: 660; 100 LIQUID ORAL; RECTAL at 11:40

## 2019-07-09 LAB — CREAT BLDA-MCNC: 0.6 MG/DL (ref 0.6–1.3)

## 2019-08-05 ENCOUNTER — APPOINTMENT (OUTPATIENT)
Dept: WOMENS IMAGING | Facility: HOSPITAL | Age: 49
End: 2019-08-05

## 2019-08-05 PROCEDURE — 77063 BREAST TOMOSYNTHESIS BI: CPT | Performed by: RADIOLOGY

## 2019-08-05 PROCEDURE — 77067 SCR MAMMO BI INCL CAD: CPT | Performed by: RADIOLOGY

## 2019-09-06 ENCOUNTER — OFFICE VISIT (OUTPATIENT)
Dept: INTERNAL MEDICINE | Facility: CLINIC | Age: 49
End: 2019-09-06

## 2019-09-06 VITALS
DIASTOLIC BLOOD PRESSURE: 76 MMHG | SYSTOLIC BLOOD PRESSURE: 104 MMHG | OXYGEN SATURATION: 99 % | HEART RATE: 76 BPM | TEMPERATURE: 98.3 F | HEIGHT: 66 IN | BODY MASS INDEX: 30.78 KG/M2 | WEIGHT: 191.5 LBS

## 2019-09-06 DIAGNOSIS — K57.92 DIVERTICULITIS: Primary | ICD-10-CM

## 2019-09-06 PROCEDURE — 99214 OFFICE O/P EST MOD 30 MIN: CPT | Performed by: INTERNAL MEDICINE

## 2019-09-06 RX ORDER — LEVOFLOXACIN 750 MG/1
750 TABLET ORAL DAILY
Qty: 7 TABLET | Refills: 0 | Status: SHIPPED | OUTPATIENT
Start: 2019-09-06 | End: 2019-10-18

## 2019-09-06 NOTE — PROGRESS NOTES
Subjective   Rita Kemp is a 49 y.o. female c/o left lower abdominal pain and bloated.  Pt states she had diverticulitis in past and she have same symptoms now.    History of Present Illness   She was treated with an abx of diverticulitis in julky  She never felt like it completely went away.  She is not having any diarrhea or fevers but she is having a lot of abdominal cramping and bloating and pain in the left lower quadrant.  She says this feels exactly like the diverticulitis she has had in the past.  Prior to July she has not had an episode in 5 years.    The following portions of the patient's history were reviewed and updated as appropriate: allergies, current medications, past medical history, past social history and problem list.  She has been eating more trail mix which has not seen it  Review of Systems   Gastrointestinal: Positive for abdominal distention and abdominal pain.       Objective   Physical Exam   Constitutional: She is oriented to person, place, and time. She appears well-developed and well-nourished.   HENT:   Head: Normocephalic and atraumatic.   Right Ear: External ear normal.   Left Ear: External ear normal.   Mouth/Throat: Oropharynx is clear and moist.   Eyes: Conjunctivae and EOM are normal. Pupils are equal, round, and reactive to light.   Neck: Normal range of motion. No tracheal deviation present. No thyromegaly present.   Cardiovascular: Normal rate, regular rhythm, normal heart sounds and intact distal pulses.   Pulmonary/Chest: Effort normal and breath sounds normal.   Abdominal: Soft. Bowel sounds are normal. She exhibits distension. There is tenderness (diffusely tender  worse in LLQ).   Musculoskeletal: Normal range of motion. She exhibits no edema or deformity.   Neurological: She is alert and oriented to person, place, and time.   Skin: Skin is warm and dry.   Psychiatric: She has a normal mood and affect. Her behavior is normal. Judgment and thought content normal.    Vitals reviewed.      Vitals:    09/06/19 0932   BP: 104/76   Pulse: 76   Temp: 98.3 °F (36.8 °C)   SpO2: 99%     Current Outpatient Medications:   •  albuterol (PROVENTIL HFA;VENTOLIN HFA) 108 (90 Base) MCG/ACT inhaler, Inhale 2 puffs Every 4 (Four) Hours As Needed for Wheezing., Disp: 6.7 g, Rfl: 1  •  ALPRAZolam (XANAX) 0.5 MG tablet, Take 0.5 mg by mouth 3 (three) times a day as needed.  , Disp: , Rfl:   •  Ascorbic Acid (VITAMIN C PO), Take  by mouth., Disp: , Rfl:   •  budesonide-formoterol (SYMBICORT) 160-4.5 MCG/ACT inhaler, Inhale 2 puffs 2 (Two) Times a Day., Disp: 1 inhaler, Rfl: 3  •  Cholecalciferol (VITAMIN D PO), Take 5,000 Units by mouth., Disp: , Rfl:   •  estradiol (ESTRACE) 2 MG tablet, Take 2 mg by mouth Daily., Disp: , Rfl: 12  •  FIBER PO, Take 3 tablets by mouth Daily., Disp: , Rfl:   •  Multiple Vitamin (MULTI-VITAMIN) tablet, Take by mouth., Disp: , Rfl:   •  Omega-3 Fatty Acids (OMEGA 3 PO), Take  by mouth., Disp: , Rfl:   •  Probiotic Product (PROBIOTIC PO), Take  by mouth., Disp: , Rfl:   •  SYNTHROID 50 MCG tablet, Take 1 tablet by mouth Daily., Disp: 90 tablet, Rfl: 3  •  levoFLOXacin (LEVAQUIN) 750 MG tablet, Take 1 tablet by mouth Daily., Disp: 7 tablet, Rfl: 0         Assessment/Plan   Diagnoses and all orders for this visit:    Diverticulitis    Other orders  -     levoFLOXacin (LEVAQUIN) 750 MG tablet; Take 1 tablet by mouth Daily.      1.  Diverticulitis: I really think she may need a CAT scan as I am concerned she might have an abscess as the last bout being out of completely healed.  She really does not want to get a CT scan at this time but would like to go on Levaquin which she says is what has worked well in the past.  We will go ahead and do that but she really needs to give me a call the first part of next week to make sure her symptoms are improving

## 2019-09-12 ENCOUNTER — OFFICE VISIT (OUTPATIENT)
Dept: GASTROENTEROLOGY | Facility: CLINIC | Age: 49
End: 2019-09-12

## 2019-09-12 VITALS
BODY MASS INDEX: 31.05 KG/M2 | TEMPERATURE: 99.4 F | SYSTOLIC BLOOD PRESSURE: 112 MMHG | DIASTOLIC BLOOD PRESSURE: 80 MMHG | WEIGHT: 193.2 LBS | HEIGHT: 66 IN

## 2019-09-12 DIAGNOSIS — R10.12 LEFT UPPER QUADRANT PAIN: ICD-10-CM

## 2019-09-12 DIAGNOSIS — K57.92 DIVERTICULITIS: Primary | ICD-10-CM

## 2019-09-12 PROCEDURE — 99214 OFFICE O/P EST MOD 30 MIN: CPT | Performed by: NURSE PRACTITIONER

## 2019-09-12 NOTE — PROGRESS NOTES
Chief Complaint   Patient presents with   • Diverticulitis       Rita Kemp is a  49 y.o. female here for a follow up visit for diverticulitis.    HPI  49-year-old female presents today for a follow-up visit for diverticulitis.  She is a patient of Dr. Ward.  She was last seen in the office on 5/2/2018.  She has a history of diverticulitis and admits recently she has had 2 flareups of it one episode in July and the other episode started last week.  For the episode in July she actually went to her primary care office and had a CT scan ordered it actually showed:      IMPRESSION:  Wall thickening with pericolic fat stranding involving the  distal left colon in the region of numerous diverticuli. The findings  are most suggestive of diverticulitis. Less likely this may represent an  infectious or inflammatory colitis.    At that time she was put on Cipro and Flagyl.  She admits she does not think these worked very well because normally she does well on Levaquin.  She admits she did well for several weeks but then around Labor Day weekend her symptoms seem to return.  At that time she called her primary care again and this time was given Levaquin 750 mg daily for 7 days.  She admits the last dose of Levaquin was taken today.  She does feel like her abdominal pain has greatly improved but it still a little bit there.  She does admit her bowels are back to normal.  She has been eating a very bland diet and has been avoiding any nuts or popcorn and has even held off taking any fiber supplement.  She has a history of recurrent episodes of diverticulitis dating back to 2006.  She does have an extensive family history of diverticulitis as well.  She admits she does not want surgery if at all possible.  But she tells me that she has done some research and if she does have to have a colon resection she would want it done by Dr. Cintia Joesph.  She does suspect that this last episode was caused possibly by eating popcorn  from [employer] and eating lots of nuts.  She is really upset about this recurrence because she had gone 5 years previously to the episode in July without any diverticulitis flares.  But she admits her diet has not been the same and she has not been exercising as much as she was at that time.  Her last colonoscopy was done in 2018.      Past Medical History:   Diagnosis Date   • Anxiety    • Asthma    • Depression    • Disease of thyroid gland    • Diverticulitis    • Diverticulitis of colon 2006 and 2019   • History of normal mammogram 2015       Past Surgical History:   Procedure Laterality Date   • COLONOSCOPY  10/24/2006    diverticulitis   [facility]VAR[facility]   • COLONOSCOPY N/A 2018    Diverticulosis, Non-bleeding internal hemorrhoids, No specimens collected.    • HYSTERECTOMY     • LEG SURGERY         Scheduled Meds:    Continuous Infusions:  No current facility-administered medications for this visit.     PRN Meds:.    No Known Allergies    Social History     Socioeconomic History   • Marital status:      Spouse name: Suhas Kemp   • Number of children: 0   • Years of education: Not on file   • Highest education level: Not on file   Occupational History   • Occupation:  HR   Tobacco Use   • Smoking status: Never Smoker   • Smokeless tobacco: Never Used   Substance and Sexual Activity   • Alcohol use: Yes     Alcohol/week: 1.2 oz     Types: 2 Standard drinks or equivalent per week     Comment: Vodka & Tonic on the weekends   • Drug use: No       Family History   Problem Relation Age of Onset   • Coronary artery disease Mother    • Macular degeneration Mother    • Thyroid disease Mother    • Heart disease Mother    • Skin cancer Mother    • Coronary artery disease Father    • Hypertension Father    • Diabetes type II Father    • Heart disease Father    • Colon polyps Father            • Diabetes Maternal Grandmother    • Skin cancer Sister    •  Alcohol abuse Sister         both sisters with hx of alcohol abuse   • Emphysema Brother            • Alcohol abuse Brother    • Alcohol abuse Sister    • Alcohol abuse Brother          due to alcohol       Review of Systems   Constitutional: Positive for fatigue. Negative for appetite change, chills, diaphoresis, fever and unexpected weight change.   HENT: Negative for nosebleeds, postnasal drip, sore throat, trouble swallowing and voice change.    Respiratory: Negative for cough, choking, chest tightness, shortness of breath and wheezing.    Cardiovascular: Negative for chest pain, palpitations and leg swelling.   Gastrointestinal: Positive for abdominal distention and abdominal pain. Negative for anal bleeding, blood in stool, constipation, diarrhea, nausea, rectal pain and vomiting.   Endocrine: Negative for polydipsia, polyphagia and polyuria.   Musculoskeletal: Negative for gait problem.   Skin: Negative for rash and wound.   Allergic/Immunologic: Negative for food allergies.   Neurological: Negative for dizziness, speech difficulty and light-headedness.   Psychiatric/Behavioral: Negative for confusion, self-injury, sleep disturbance and suicidal ideas.       Vitals:    19 1552   BP: 112/80   Temp: 99.4 °F (37.4 °C)       Physical Exam   Constitutional: She is oriented to person, place, and time. She appears well-developed and well-nourished. She does not appear ill. No distress.   HENT:   Head: Normocephalic.   Eyes: Pupils are equal, round, and reactive to light.   Cardiovascular: Normal rate, regular rhythm and normal heart sounds.   Pulmonary/Chest: Effort normal and breath sounds normal.   Abdominal: Soft. Bowel sounds are normal. She exhibits distension. She exhibits no mass. There is no hepatosplenomegaly. There is tenderness. There is no rebound and no guarding. No hernia.       Musculoskeletal: Normal range of motion.   Neurological: She is alert and oriented to person, place, and  time.   Skin: Skin is warm and dry.   Psychiatric: She has a normal mood and affect. Her speech is normal and behavior is normal. Judgment normal.       No images are attached to the encounter.    Assessment and plan     1. Diverticulitis  - Case Request; Standing  - Case Request    2. Left upper quadrant pain  - Case Request; Standing  - Case Request    Reviewed CT scan results with the patient today.  She finished her last dose of Levaquin today.  Abdominal pain has improved but is not 100% gone.  Continue a bland diet for now.  Avoid all popcorn and nuts at this time.  Patient does not want surgery at this time.  We did discuss surgical options and she does prefer to see Dr. Cintia Joseph if it comes to this.  Patient will need a colonoscopy in 8 to 10 weeks to assess healing with Dr. Ward.  Patient to go ahead and schedule that today while she is here.  Patient to follow-up with me in 2 weeks.  Patient to hold off on her fiber supplement until she sees me in 2 weeks.  Patient to call the office next week with an update.

## 2019-09-17 PROBLEM — R10.12 LEFT UPPER QUADRANT PAIN: Status: ACTIVE | Noted: 2019-09-17

## 2019-09-17 PROBLEM — K57.92 DIVERTICULITIS: Status: ACTIVE | Noted: 2019-09-17

## 2019-09-27 ENCOUNTER — OFFICE VISIT (OUTPATIENT)
Dept: GASTROENTEROLOGY | Facility: CLINIC | Age: 49
End: 2019-09-27

## 2019-09-27 VITALS
DIASTOLIC BLOOD PRESSURE: 68 MMHG | SYSTOLIC BLOOD PRESSURE: 118 MMHG | TEMPERATURE: 98 F | BODY MASS INDEX: 30.79 KG/M2 | HEIGHT: 66 IN | WEIGHT: 191.6 LBS

## 2019-09-27 DIAGNOSIS — R10.32 LEFT LOWER QUADRANT PAIN: ICD-10-CM

## 2019-09-27 DIAGNOSIS — K57.92 DIVERTICULITIS: Primary | ICD-10-CM

## 2019-09-27 DIAGNOSIS — K59.00 CONSTIPATION, UNSPECIFIED CONSTIPATION TYPE: ICD-10-CM

## 2019-09-27 PROCEDURE — 99214 OFFICE O/P EST MOD 30 MIN: CPT | Performed by: NURSE PRACTITIONER

## 2019-09-27 NOTE — PROGRESS NOTES
Chief Complaint   Patient presents with   • Diverticulitis       Rita Kemp is a  49 y.o. female here for a follow up visit for diverticulitis.    HPI  49-year-old female presents today for follow-up visit for diverticulitis with left lower quadrant abdominal pain.  She is a patient of Dr. Ward.  She was last seen in the office on 9/12/2019.  She has a history of recurrent diverticulitis and recently finished antibiotics and admits she is feeling a lot better now.  She did go on vacation last week and admits she did eat a few nights and some spinach but otherwise did okay.  She has not started back on her fiber supplementation.  She was not sure when she needed to restart that.  She is feeling more constipated since finishing the antibiotics.  She is wondering if she needs to take her MiraLAX again.  She is scheduled for a colonoscopy to assess healing with Dr. Ward in November.  She denies any dysphagia, abdominal pain, nausea and vomiting, reflux, diarrhea, rectal bleeding or melena.  She admits her appetite is good and her weight is stable.  Last colonoscopy was done in August 2018.    Past Medical History:   Diagnosis Date   • Anxiety    • Asthma    • Depression    • Disease of thyroid gland    • Diverticulitis    • Diverticulitis of colon 8- July and September 2019   • History of normal mammogram 07/2015       Past Surgical History:   Procedure Laterality Date   • COLONOSCOPY  10/24/2006    diverticulitis   Cande ALVARADO   • COLONOSCOPY N/A 8/6/2018    Diverticulosis, Non-bleeding internal hemorrhoids, No specimens collected.    • HYSTERECTOMY  2009   • LEG SURGERY         Scheduled Meds:    Continuous Infusions:  No current facility-administered medications for this visit.     PRN Meds:.    No Known Allergies    Social History     Socioeconomic History   • Marital status:      Spouse name: Suhas Kemp   • Number of children: 0   • Years of education: Not on file   • Highest  education level: Not on file   Occupational History   • Occupation:  HR   Tobacco Use   • Smoking status: Never Smoker   • Smokeless tobacco: Never Used   Substance and Sexual Activity   • Alcohol use: Yes     Alcohol/week: 1.2 oz     Types: 2 Standard drinks or equivalent per week     Comment: Vodka & Tonic on the weekends   • Drug use: No       Family History   Problem Relation Age of Onset   • Coronary artery disease Mother    • Macular degeneration Mother    • Thyroid disease Mother    • Heart disease Mother    • Skin cancer Mother    • Coronary artery disease Father    • Hypertension Father    • Diabetes type II Father    • Heart disease Father    • Colon polyps Father            • Diabetes Maternal Grandmother    • Skin cancer Sister    • Alcohol abuse Sister         both sisters with hx of alcohol abuse   • Emphysema Brother            • Alcohol abuse Brother    • Alcohol abuse Sister    • Alcohol abuse Brother          due to alcohol       Review of Systems   Constitutional: Negative for appetite change, chills, diaphoresis, fatigue, fever and unexpected weight change.   HENT: Negative for nosebleeds, postnasal drip, sore throat, trouble swallowing and voice change.    Respiratory: Negative for cough, choking, chest tightness, shortness of breath and wheezing.    Cardiovascular: Negative for chest pain, palpitations and leg swelling.   Gastrointestinal: Positive for abdominal distention and constipation. Negative for abdominal pain, anal bleeding, blood in stool, diarrhea, nausea, rectal pain and vomiting.   Endocrine: Negative for polydipsia, polyphagia and polyuria.   Musculoskeletal: Negative for gait problem.   Skin: Negative for rash and wound.   Allergic/Immunologic: Negative for food allergies.   Neurological: Negative for dizziness, speech difficulty and light-headedness.   Psychiatric/Behavioral: Negative for confusion, self-injury, sleep disturbance and  suicidal ideas.       Vitals:    09/27/19 1508   BP: 118/68   Temp: 98 °F (36.7 °C)       Physical Exam   Constitutional: She is oriented to person, place, and time. She appears well-developed and well-nourished. She does not appear ill. No distress.   HENT:   Head: Normocephalic.   Eyes: Pupils are equal, round, and reactive to light.   Cardiovascular: Normal rate, regular rhythm and normal heart sounds.   Pulmonary/Chest: Effort normal and breath sounds normal.   Abdominal: Soft. Bowel sounds are normal. She exhibits distension. She exhibits no mass. There is no hepatosplenomegaly. There is no tenderness. There is no rebound and no guarding. No hernia.   Musculoskeletal: Normal range of motion.   Neurological: She is alert and oriented to person, place, and time.   Skin: Skin is warm and dry.   Psychiatric: She has a normal mood and affect. Her speech is normal and behavior is normal. Judgment normal.       No images are attached to the encounter.    Assessment and plan    1. Diverticulitis    2. Left lower quadrant pain    3. Constipation, unspecified constipation type    She seems to be much improved today after finishing her antibiotics.  Okay to start her fiber supplement again.  Recommend a high-fiber diet now.  For her constipation I would definitely recommend that she get back on her MiraLAX as needed.  Continue to increase her water intake.  Patient is scheduled for colonoscopy with Dr. Ward to assess healing this November.  Patient to call the office with any issues.  Patient to follow-up with me in 3 weeks.

## 2019-10-18 ENCOUNTER — OFFICE VISIT (OUTPATIENT)
Dept: GASTROENTEROLOGY | Facility: CLINIC | Age: 49
End: 2019-10-18

## 2019-10-18 VITALS
HEIGHT: 66 IN | TEMPERATURE: 97.6 F | BODY MASS INDEX: 30.73 KG/M2 | WEIGHT: 191.2 LBS | SYSTOLIC BLOOD PRESSURE: 124 MMHG | DIASTOLIC BLOOD PRESSURE: 76 MMHG

## 2019-10-18 DIAGNOSIS — R10.32 LEFT LOWER QUADRANT ABDOMINAL PAIN: Primary | ICD-10-CM

## 2019-10-18 DIAGNOSIS — K59.00 CONSTIPATION, UNSPECIFIED CONSTIPATION TYPE: ICD-10-CM

## 2019-10-18 DIAGNOSIS — K57.92 DIVERTICULITIS: ICD-10-CM

## 2019-10-18 LAB
ALBUMIN SERPL-MCNC: 4.4 G/DL (ref 3.5–5.2)
ALBUMIN/GLOB SERPL: 1.6 G/DL
ALP SERPL-CCNC: 82 U/L (ref 39–117)
ALT SERPL-CCNC: 17 U/L (ref 1–33)
AST SERPL-CCNC: 13 U/L (ref 1–32)
BASOPHILS # BLD AUTO: 0.04 10*3/MM3 (ref 0–0.2)
BASOPHILS NFR BLD AUTO: 0.5 % (ref 0–1.5)
BILIRUB SERPL-MCNC: 0.3 MG/DL (ref 0.2–1.2)
BUN SERPL-MCNC: 9 MG/DL (ref 6–20)
BUN/CREAT SERPL: 15.5 (ref 7–25)
CALCIUM SERPL-MCNC: 9.1 MG/DL (ref 8.6–10.5)
CHLORIDE SERPL-SCNC: 98 MMOL/L (ref 98–107)
CO2 SERPL-SCNC: 25.1 MMOL/L (ref 22–29)
CREAT SERPL-MCNC: 0.58 MG/DL (ref 0.57–1)
EOSINOPHIL # BLD AUTO: 0.13 10*3/MM3 (ref 0–0.4)
EOSINOPHIL NFR BLD AUTO: 1.5 % (ref 0.3–6.2)
ERYTHROCYTE [DISTWIDTH] IN BLOOD BY AUTOMATED COUNT: 12.5 % (ref 12.3–15.4)
GLOBULIN SER CALC-MCNC: 2.7 GM/DL
GLUCOSE SERPL-MCNC: 82 MG/DL (ref 65–99)
HCT VFR BLD AUTO: 34.8 % (ref 34–46.6)
HGB BLD-MCNC: 11.8 G/DL (ref 12–15.9)
IMM GRANULOCYTES # BLD AUTO: 0.03 10*3/MM3 (ref 0–0.05)
IMM GRANULOCYTES NFR BLD AUTO: 0.3 % (ref 0–0.5)
LYMPHOCYTES # BLD AUTO: 2.3 10*3/MM3 (ref 0.7–3.1)
LYMPHOCYTES NFR BLD AUTO: 26.8 % (ref 19.6–45.3)
MCH RBC QN AUTO: 30.5 PG (ref 26.6–33)
MCHC RBC AUTO-ENTMCNC: 33.9 G/DL (ref 31.5–35.7)
MCV RBC AUTO: 89.9 FL (ref 79–97)
MONOCYTES # BLD AUTO: 0.81 10*3/MM3 (ref 0.1–0.9)
MONOCYTES NFR BLD AUTO: 9.4 % (ref 5–12)
NEUTROPHILS # BLD AUTO: 5.27 10*3/MM3 (ref 1.7–7)
NEUTROPHILS NFR BLD AUTO: 61.5 % (ref 42.7–76)
NRBC BLD AUTO-RTO: 0 /100 WBC (ref 0–0.2)
PLATELET # BLD AUTO: 206 10*3/MM3 (ref 140–450)
POTASSIUM SERPL-SCNC: 4.6 MMOL/L (ref 3.5–5.2)
PROT SERPL-MCNC: 7.1 G/DL (ref 6–8.5)
RBC # BLD AUTO: 3.87 10*6/MM3 (ref 3.77–5.28)
SODIUM SERPL-SCNC: 135 MMOL/L (ref 136–145)
WBC # BLD AUTO: 8.58 10*3/MM3 (ref 3.4–10.8)

## 2019-10-18 PROCEDURE — 99214 OFFICE O/P EST MOD 30 MIN: CPT | Performed by: NURSE PRACTITIONER

## 2019-10-18 RX ORDER — AMOXICILLIN AND CLAVULANATE POTASSIUM 875; 125 MG/1; MG/1
1 TABLET, FILM COATED ORAL 2 TIMES DAILY
Qty: 20 TABLET | Refills: 0 | Status: SHIPPED | OUTPATIENT
Start: 2019-10-18 | End: 2019-10-28

## 2019-10-18 RX ORDER — DICYCLOMINE HCL 20 MG
20 TABLET ORAL 3 TIMES DAILY PRN
Qty: 90 TABLET | Refills: 5 | Status: SHIPPED | OUTPATIENT
Start: 2019-10-18 | End: 2020-03-06

## 2019-10-18 NOTE — PROGRESS NOTES
Chief Complaint   Patient presents with   • Follow-up   • Diverticulitis     current flare       Rita Kemp is a  49 y.o. female here for a follow up visit for diverticulitis.    HPI  49-year-old female presents today accompanied by her  for acute left-sided abdominal pain.  She is a patient of Dr. Ward.  She was last seen in the office on 9/27/2019.  She was recently treated in July for her first episode of diverticulitis this year.  At that time she was treated with Cipro and Flagyl.  She had complete resolution but then ended up with another episode of it this past August into September.  She was treated at that time with Levaquin.  She did seem to have complete resolution of all of her symptoms at that time.  She has since been doing really well on a high-fiber diet with fiber supplementation.  She even was able to go on vacation and did well.  She does admit this past weekend she ate some brownies with pumpkin seeds on the top as well as a big Caesar salad earlier this week.  Since Wednesday she admits she had excruciating left-sided lower abdominal pain with gas and bloating.  She does have a history of chronic constipation and admits her bowels have been a little looser lately as well.  She is very worried that she is having another diverticulitis flare.  She has been taking hydrocodone per her primary care office for the pain.  She denies any dysphagia, nausea and vomiting, rectal bleeding or melena.  She notes her appetite is decreased and her weight is stable.  She tells me now she is afraid to take or eat anything.  She is worried she is going need surgery.  She says she felt so bad last night she almost went to the hospital.  She is scheduled for a colonoscopy to assess healing of the last round of diverticulitis with Dr. Ward next month.    Past Medical History:   Diagnosis Date   • Anxiety    • Asthma    • Depression    • Disease of thyroid gland    • Diverticulitis    • Diverticulitis of  colon 2006 and 2019   • History of normal mammogram 2015       Past Surgical History:   Procedure Laterality Date   • COLONOSCOPY  10/24/2006    diverticulitis   Cande ALVARADO   • COLONOSCOPY N/A 2018    Diverticulosis, Non-bleeding internal hemorrhoids, No specimens collected.    • HYSTERECTOMY     • LEG SURGERY         Scheduled Meds:    Continuous Infusions:  No current facility-administered medications for this visit.     PRN Meds:.    No Known Allergies    Social History     Socioeconomic History   • Marital status:      Spouse name: Suhas Kemp   • Number of children: 0   • Years of education: Not on file   • Highest education level: Not on file   Occupational History   • Occupation:  HR   Tobacco Use   • Smoking status: Never Smoker   • Smokeless tobacco: Never Used   Substance and Sexual Activity   • Alcohol use: Yes     Alcohol/week: 1.2 oz     Types: 2 Standard drinks or equivalent per week     Comment: Vodka & Tonic on the weekends   • Drug use: No       Family History   Problem Relation Age of Onset   • Coronary artery disease Mother    • Macular degeneration Mother    • Thyroid disease Mother    • Heart disease Mother    • Skin cancer Mother    • Coronary artery disease Father    • Hypertension Father    • Diabetes type II Father    • Heart disease Father    • Colon polyps Father            • Diabetes Maternal Grandmother    • Skin cancer Sister    • Alcohol abuse Sister         both sisters with hx of alcohol abuse   • Emphysema Brother            • Alcohol abuse Brother    • Alcohol abuse Sister    • Alcohol abuse Brother          due to alcohol       Review of Systems   Constitutional: Positive for appetite change and fatigue. Negative for chills, diaphoresis, fever and unexpected weight change.   HENT: Negative for nosebleeds, postnasal drip, sore throat, trouble swallowing and voice change.    Respiratory:  Negative for cough, choking, chest tightness, shortness of breath and wheezing.    Cardiovascular: Negative for chest pain, palpitations and leg swelling.   Gastrointestinal: Positive for abdominal distention, abdominal pain and constipation. Negative for anal bleeding, blood in stool, diarrhea, nausea, rectal pain and vomiting.   Endocrine: Negative for polydipsia, polyphagia and polyuria.   Musculoskeletal: Negative for gait problem.   Skin: Negative for rash and wound.   Allergic/Immunologic: Negative for food allergies.   Neurological: Negative for dizziness, speech difficulty and light-headedness.   Psychiatric/Behavioral: Negative for confusion, self-injury, sleep disturbance and suicidal ideas.       Vitals:    10/18/19 1104   BP: 124/76   Temp: 97.6 °F (36.4 °C)       Physical Exam   Constitutional: She is oriented to person, place, and time. She appears well-developed and well-nourished. She does not appear ill. No distress.   HENT:   Head: Normocephalic.   Eyes: Pupils are equal, round, and reactive to light.   Cardiovascular: Normal rate, regular rhythm and normal heart sounds.   Pulmonary/Chest: Effort normal and breath sounds normal.   Abdominal: Soft. Bowel sounds are normal. She exhibits distension. She exhibits no mass. There is no hepatosplenomegaly. There is tenderness. There is no rebound and no guarding. No hernia.       Musculoskeletal: Normal range of motion.   Neurological: She is alert and oriented to person, place, and time.   Skin: Skin is warm and dry.   Psychiatric: She has a normal mood and affect. Her speech is normal and behavior is normal. Judgment normal.       No images are attached to the encounter.    Assessment and plan     1. Left lower quadrant abdominal pain  - CBC & Differential  - Comprehensive Metabolic Panel  - CT Abdomen Pelvis With Contrast; Future    2. Diverticulitis  - CBC & Differential  - Comprehensive Metabolic Panel  - CT Abdomen Pelvis With Contrast; Future  -  amoxicillin-clavulanate (AUGMENTIN) 875-125 MG per tablet; Take 1 tablet by mouth 2 (Two) Times a Day for 10 days.  Dispense: 20 tablet; Refill: 0    3. Constipation, unspecified constipation type  - CBC & Differential  - Comprehensive Metabolic Panel  - CT Abdomen Pelvis With Contrast; Future    Not sure what is causing her current left lower quadrant abdominal pain.  I am worried that it could be another flare of diverticulitis.  Will check labs and a CT scan to start.  Will also start her on Augmentin given her symptoms and history.  Will also give her some bentyl for the pain.  Advised her to start a low residue diet.  Recommended if she is not better over the weekend to go on into [facility] [place], operated by [facility] for immediate evaluation.  Patient to call the office Monday with an update.  Patient to keep her scheduled colonoscopy for next month with Dr. Ward for now.  Patient to call the office with any issues.

## 2019-10-21 ENCOUNTER — TELEPHONE (OUTPATIENT)
Dept: GASTROENTEROLOGY | Facility: CLINIC | Age: 49
End: 2019-10-21

## 2019-10-21 NOTE — TELEPHONE ENCOUNTER
Call to pt.  Advise per M  Deepthi that hgb slightly lower at 11.8 from 12.93 years ago.  But otherwise, labs look good.  Will await CT results.    Verb understanding.  States taking abx and bentyl as instructed 10/18.  Feeling better.    Update to M Deepthi.

## 2019-10-21 NOTE — TELEPHONE ENCOUNTER
----- Message from REMIGIO Zelaya sent at 10/21/2019  9:02 AM EDT -----  Please call the patient with her lab results.  Hemoglobin was slightly lower at 11.8 from 12.93 years ago.  But otherwise her labs look good.  Will await CT scan results.

## 2019-10-24 ENCOUNTER — HOSPITAL ENCOUNTER (OUTPATIENT)
Dept: CT IMAGING | Facility: HOSPITAL | Age: 49
Discharge: HOME | End: 2019-10-24
Admitting: NURSE PRACTITIONER

## 2019-10-24 DIAGNOSIS — K59.00 CONSTIPATION, UNSPECIFIED CONSTIPATION TYPE: ICD-10-CM

## 2019-10-24 DIAGNOSIS — K57.92 DIVERTICULITIS: ICD-10-CM

## 2019-10-24 DIAGNOSIS — R10.32 LEFT LOWER QUADRANT ABDOMINAL PAIN: ICD-10-CM

## 2019-10-24 PROCEDURE — 25010000002 IOPAMIDOL 61 % SOLUTION: Performed by: NURSE PRACTITIONER

## 2019-10-24 PROCEDURE — 74177 CT ABD & PELVIS W/CONTRAST: CPT

## 2019-10-24 RX ADMIN — IOPAMIDOL 85 ML: 612 INJECTION, SOLUTION INTRAVENOUS at 08:41

## 2019-10-28 ENCOUNTER — TELEPHONE (OUTPATIENT)
Dept: GASTROENTEROLOGY | Facility: CLINIC | Age: 49
End: 2019-10-28

## 2019-10-28 NOTE — TELEPHONE ENCOUNTER
----- Message from REMIGIO Zelaya sent at 10/28/2019 12:25 PM EDT -----  CONCLUSION: There is diverticulosis of the colon, however no overt  diverticulitis.  No gross colon wall thickening suggestive of colitis.  The small bowel is satisfactory in appearance.       Please call the patient and let her know that her CT scan was negative for any acute abdominal abnormalities at this time.  No diverticulitis or colitis was noted.  How is she doing?

## 2019-10-28 NOTE — TELEPHONE ENCOUNTER
Called pt and advised per Emerald COOPER that her ct scan was neg for any acute abdominal abnormalities at this time.  No diverticulitis or colitis was noted.    Pt verb understanding and reports she is feeling better.  Her discomfort is gone and her stools are normal. Pt is asking if she should still keep her appt with Emerald COOPER on Friday 11/01.  Advised will send message to Emerald COOPER

## 2019-10-28 NOTE — TELEPHONE ENCOUNTER
I think it would be good at this point for her to keep her appointment just so we can document how she is doing now.  But she can cancel it if it is going to be an issue.

## 2019-10-28 NOTE — TELEPHONE ENCOUNTER
Call to pt.  Advise per ANUPAMA Lacey that think would be good at this point to keep appt just so can document how pt doing do.  But can cancel it if going to be an issue.    Verb understanding.  States plans on keeping appt.

## 2019-11-01 ENCOUNTER — OFFICE VISIT (OUTPATIENT)
Dept: GASTROENTEROLOGY | Facility: CLINIC | Age: 49
End: 2019-11-01

## 2019-11-01 VITALS
SYSTOLIC BLOOD PRESSURE: 148 MMHG | WEIGHT: 193.2 LBS | DIASTOLIC BLOOD PRESSURE: 76 MMHG | TEMPERATURE: 98.4 F | HEIGHT: 67 IN | BODY MASS INDEX: 30.32 KG/M2

## 2019-11-01 DIAGNOSIS — R10.30 LOWER ABDOMINAL PAIN: Primary | ICD-10-CM

## 2019-11-01 DIAGNOSIS — R19.7 DIARRHEA, UNSPECIFIED TYPE: ICD-10-CM

## 2019-11-01 DIAGNOSIS — K57.92 DIVERTICULITIS: ICD-10-CM

## 2019-11-01 PROCEDURE — 99214 OFFICE O/P EST MOD 30 MIN: CPT | Performed by: NURSE PRACTITIONER

## 2019-11-01 NOTE — PROGRESS NOTES
Chief Complaint   Patient presents with   • Abdominal Pain       Rita Kemp is a  49 y.o. female here for a follow up visit for abdominal pain.    HPI  29-year-old female presents today accompanied by her  for follow-up visit for left-sided abdominal pain with history of diverticulitis.  She is a patient of Dr. Ward.  She was last seen in the office on 10/18/2019.  She was recently put on Augmentin for a suspected episode of diverticulitis.  CT scan was negative and labs were unremarkable.  She has since finished the Augmentin and admits she is feeling much better now.  She does me that the left-sided abdominal pain is completely resolved.  She is staying off of all fiber at this time.  She is eating a very bland diet.  She does think that the bentyl helps with her abdominal pain and cramping.  She admits her bowels are moving well at this time.  She is scheduled for a colonoscopy to assess healing with Dr. Ward later this month.  She denies any dysphagia, abdominal pain, nausea and vomiting, diarrhea, constipation, rectal bleeding or melena.  She was her appetite is good and her weight is stable.  Her last colonoscopy was on 8/2018.  Past Medical History:   Diagnosis Date   • Anxiety    • Asthma    • Depression    • Disease of thyroid gland    • Diverticulitis    • Diverticulitis of colon 8- July and September 2019   • History of normal mammogram 07/2015       Past Surgical History:   Procedure Laterality Date   • COLONOSCOPY  10/24/2006    diverticulitis   Cande ALVARADO   • COLONOSCOPY N/A 8/6/2018    Diverticulosis, Non-bleeding internal hemorrhoids, No specimens collected.    • HYSTERECTOMY  2009   • LEG SURGERY         Scheduled Meds:    Continuous Infusions:  No current facility-administered medications for this visit.     PRN Meds:.    No Known Allergies    Social History     Socioeconomic History   • Marital status:      Spouse name: Suhas Kemp   • Number of children: 0    • Years of education: Not on file   • Highest education level: Not on file   Occupational History   • Occupation:  HR   Tobacco Use   • Smoking status: Never Smoker   • Smokeless tobacco: Never Used   Substance and Sexual Activity   • Alcohol use: Yes     Alcohol/week: 1.2 oz     Types: 2 Standard drinks or equivalent per week     Comment: Vodka & Tonic on the weekends   • Drug use: No       Family History   Problem Relation Age of Onset   • Coronary artery disease Mother    • Macular degeneration Mother    • Thyroid disease Mother    • Heart disease Mother    • Skin cancer Mother    • Coronary artery disease Father    • Hypertension Father    • Diabetes type II Father    • Heart disease Father    • Colon polyps Father            • Diabetes Maternal Grandmother    • Skin cancer Sister    • Alcohol abuse Sister         both sisters with hx of alcohol abuse   • Emphysema Brother            • Alcohol abuse Brother    • Alcohol abuse Sister    • Alcohol abuse Brother          due to alcohol       Review of Systems   Constitutional: Negative for appetite change, chills, diaphoresis, fatigue, fever and unexpected weight change.   HENT: Negative for nosebleeds, postnasal drip, sore throat, trouble swallowing and voice change.    Respiratory: Negative for cough, choking, chest tightness, shortness of breath and wheezing.    Cardiovascular: Negative for chest pain, palpitations and leg swelling.   Gastrointestinal: Negative for abdominal distention, abdominal pain, anal bleeding, blood in stool, constipation, diarrhea, nausea, rectal pain and vomiting.   Endocrine: Negative for polydipsia, polyphagia and polyuria.   Musculoskeletal: Negative for gait problem.   Skin: Negative for rash and wound.   Allergic/Immunologic: Negative for food allergies.   Neurological: Negative for dizziness, speech difficulty and light-headedness.   Psychiatric/Behavioral: Negative for confusion,  self-injury, sleep disturbance and suicidal ideas.       Vitals:    11/01/19 1518   BP: 148/76   Temp: 98.4 °F (36.9 °C)       Physical Exam   Constitutional: She is oriented to person, place, and time. She appears well-developed and well-nourished. She does not appear ill. No distress.   HENT:   Head: Normocephalic.   Eyes: Pupils are equal, round, and reactive to light.   Cardiovascular: Normal rate, regular rhythm and normal heart sounds.   Pulmonary/Chest: Effort normal and breath sounds normal.   Abdominal: Soft. Bowel sounds are normal. She exhibits no distension and no mass. There is no hepatosplenomegaly. There is no tenderness. There is no rebound and no guarding. No hernia.   Musculoskeletal: Normal range of motion.   Neurological: She is alert and oriented to person, place, and time.   Skin: Skin is warm and dry.   Psychiatric: She has a normal mood and affect. Her speech is normal and behavior is normal. Judgment normal.       No images are attached to the encounter.    Assessment and plan    1. Lower abdominal pain    2. Diverticulitis    3. Diarrhea, unspecified type    Reviewed imaging and lab results with the patient today.  She does seem to be much better today after finishing the Augmentin and getting back on a good daily probiotic supplement.  Continue a low residue diet.  Continue Bentyl as needed.  Keep scheduled colonoscopy later this month with Dr. Ward as planned.  Patient to call the office with any issues.

## 2019-11-18 ENCOUNTER — ANESTHESIA (OUTPATIENT)
Dept: GASTROENTEROLOGY | Facility: HOSPITAL | Age: 49
End: 2019-11-18

## 2019-11-18 ENCOUNTER — ANESTHESIA EVENT (OUTPATIENT)
Dept: GASTROENTEROLOGY | Facility: HOSPITAL | Age: 49
End: 2019-11-18

## 2019-11-18 ENCOUNTER — HOSPITAL ENCOUNTER (OUTPATIENT)
Facility: HOSPITAL | Age: 49
Setting detail: HOSPITAL OUTPATIENT SURGERY
Discharge: HOME | End: 2019-11-18
Attending: INTERNAL MEDICINE | Admitting: INTERNAL MEDICINE

## 2019-11-18 VITALS
OXYGEN SATURATION: 100 % | RESPIRATION RATE: 16 BRPM | HEART RATE: 62 BPM | HEIGHT: 67 IN | WEIGHT: 187.19 LBS | BODY MASS INDEX: 29.38 KG/M2 | DIASTOLIC BLOOD PRESSURE: 76 MMHG | TEMPERATURE: 98.2 F | SYSTOLIC BLOOD PRESSURE: 130 MMHG

## 2019-11-18 DIAGNOSIS — R10.12 LEFT UPPER QUADRANT PAIN: ICD-10-CM

## 2019-11-18 DIAGNOSIS — K57.92 DIVERTICULITIS: ICD-10-CM

## 2019-11-18 PROCEDURE — 88305 TISSUE EXAM BY PATHOLOGIST: CPT | Performed by: INTERNAL MEDICINE

## 2019-11-18 PROCEDURE — S0260 H&P FOR SURGERY: HCPCS | Performed by: INTERNAL MEDICINE

## 2019-11-18 PROCEDURE — 45385 COLONOSCOPY W/LESION REMOVAL: CPT | Performed by: INTERNAL MEDICINE

## 2019-11-18 PROCEDURE — 25010000002 PROPOFOL 10 MG/ML EMULSION: Performed by: ANESTHESIOLOGY

## 2019-11-18 RX ORDER — SODIUM CHLORIDE 0.9 % (FLUSH) 0.9 %
10 SYRINGE (ML) INJECTION AS NEEDED
Status: DISCONTINUED | OUTPATIENT
Start: 2019-11-18 | End: 2019-11-18 | Stop reason: HOSPADM

## 2019-11-18 RX ORDER — LIDOCAINE HYDROCHLORIDE 20 MG/ML
INJECTION, SOLUTION INFILTRATION; PERINEURAL AS NEEDED
Status: DISCONTINUED | OUTPATIENT
Start: 2019-11-18 | End: 2019-11-18 | Stop reason: SURG

## 2019-11-18 RX ORDER — SODIUM CHLORIDE, SODIUM LACTATE, POTASSIUM CHLORIDE, CALCIUM CHLORIDE 600; 310; 30; 20 MG/100ML; MG/100ML; MG/100ML; MG/100ML
1000 INJECTION, SOLUTION INTRAVENOUS CONTINUOUS
Status: DISCONTINUED | OUTPATIENT
Start: 2019-11-18 | End: 2019-11-18 | Stop reason: HOSPADM

## 2019-11-18 RX ORDER — PROPOFOL 10 MG/ML
VIAL (ML) INTRAVENOUS CONTINUOUS PRN
Status: DISCONTINUED | OUTPATIENT
Start: 2019-11-18 | End: 2019-11-18 | Stop reason: SURG

## 2019-11-18 RX ORDER — PROPOFOL 10 MG/ML
VIAL (ML) INTRAVENOUS AS NEEDED
Status: DISCONTINUED | OUTPATIENT
Start: 2019-11-18 | End: 2019-11-18 | Stop reason: SURG

## 2019-11-18 RX ADMIN — SODIUM CHLORIDE, POTASSIUM CHLORIDE, SODIUM LACTATE AND CALCIUM CHLORIDE 1000 ML: 600; 310; 30; 20 INJECTION, SOLUTION INTRAVENOUS at 14:57

## 2019-11-18 RX ADMIN — LIDOCAINE HYDROCHLORIDE 100 MG: 20 INJECTION, SOLUTION INFILTRATION; PERINEURAL at 15:05

## 2019-11-18 RX ADMIN — PROPOFOL 300 MCG/KG/MIN: 10 INJECTION, EMULSION INTRAVENOUS at 15:07

## 2019-11-18 RX ADMIN — SODIUM CHLORIDE, POTASSIUM CHLORIDE, SODIUM LACTATE AND CALCIUM CHLORIDE: 600; 310; 30; 20 INJECTION, SOLUTION INTRAVENOUS at 15:02

## 2019-11-18 RX ADMIN — PROPOFOL 200 MG: 10 INJECTION, EMULSION INTRAVENOUS at 15:05

## 2019-11-18 NOTE — ANESTHESIA POSTPROCEDURE EVALUATION
"Patient: Rita Kemp    Procedure Summary     Date:  11/18/19 Room / Location:   [facility]    Anesthesia Start:  1502 Anesthesia Stop:  1535    Procedure:  COLONOSCOPY to cecum with cold snare polypectomy (N/A ) Diagnosis:       Diverticulitis      Left upper quadrant pain      (Diverticulitis [K57.92])      (Left upper quadrant pain [R10.12])    Surgeon:  Gisela Ward MD Provider:  Steven Barrientos MD    Anesthesia Type:  MAC ASA Status:  2          Anesthesia Type: MAC  Last vitals  BP   124/74 (11/18/19 1452)   Temp       Pulse   62 (11/18/19 1452)   Resp   16 (11/18/19 1452)     SpO2   98 % (11/18/19 1452)     Post Anesthesia Care and Evaluation    Patient location during evaluation: bedside  Patient participation: complete - patient participated  Level of consciousness: sleepy but conscious  Pain score: 0  Pain management: adequate  Airway patency: patent  Anesthetic complications: No anesthetic complications    Cardiovascular status: acceptable  Respiratory status: acceptable  Hydration status: acceptable    Comments: /74 (BP Location: Left arm, Patient Position: Lying)   Pulse 62   Resp 16   Ht 168.9 cm (66.5\")   Wt 84.9 kg (187 lb 3 oz)   SpO2 98%   BMI 29.76 kg/m²         "

## 2019-11-18 NOTE — ANESTHESIA PREPROCEDURE EVALUATION
Anesthesia Evaluation     Patient summary reviewed and Nursing notes reviewed   NPO Solid Status: > 8 hours  NPO Liquid Status: > 2 hours           Airway   Mallampati: II  Neck ROM: full  No difficulty expected  Dental - normal exam     Pulmonary     breath sounds clear to auscultation  (+) asthma,  Cardiovascular     Rhythm: regular    (+) hyperlipidemia,       Neuro/Psych  (+) psychiatric history Anxiety and Depression,     GI/Hepatic/Renal/Endo    (+) obesity, morbid obesity,      Musculoskeletal     Abdominal   (+) obese,    Substance History      OB/GYN          Other                        Anesthesia Plan    ASA 2     MAC     intravenous induction     Anesthetic plan, all risks, benefits, and alternatives have been provided, discussed and informed consent has been obtained with: patient.

## 2019-11-18 NOTE — DISCHARGE INSTRUCTIONS
For the next 24 hours patient needs to be with a responsible adult.    For 24 hours DO NOT drive, operate machinery, appliances, drink alcohol, make important decisions or sign legal documents.    Start with a light or bland diet if you are feeling sick to your stomach otherwise advance to regular diet as tolerated.    Follow recommendations on procedure report if provided by your doctor.    Call Dr Ward for problems .  Problems may include but not limited to: large amounts of bleeding, trouble breathing, repeated vomiting, severe unrelieved pain, fever or chills.

## 2019-11-18 NOTE — H&P
[facility], [place], operated by [facility]  Pre Procedure History & Physical    Chief Complaint:   Recurrent diverticulitis    Subjective     HPI:   49-year-old female with a history of recurrent diverticulitis.  She underwent a bout of this in July which was documented by CT.  She has had recurrent symptoms twice which required additional treatment with antibiotics.  Repeat CT in October did not show any evidence of diverticulitis at that time however it is noted that her colon was somewhat collapsed limiting assessment of colonic thickening.  She is now feeling back to baseline with no abdominal pain or change in bowel habits.    Past Medical History:   Past Medical History:   Diagnosis Date   • Anxiety    • Asthma    • Depression    • Disease of thyroid gland    • Diverticulitis    • Diverticulitis of colon 2006 and 2019   • History of normal mammogram 2015       Past Surgical History:  Past Surgical History:   Procedure Laterality Date   • COLONOSCOPY  10/24/2006    diverticulitis   Cande ALVARADO   • COLONOSCOPY N/A 2018    Diverticulosis, Non-bleeding internal hemorrhoids, No specimens collected.    • HYSTERECTOMY     • LEG SURGERY         Family History:  Family History   Problem Relation Age of Onset   • Coronary artery disease Mother    • Macular degeneration Mother    • Thyroid disease Mother    • Heart disease Mother    • Skin cancer Mother    • Coronary artery disease Father    • Hypertension Father    • Diabetes type II Father    • Heart disease Father    • Colon polyps Father            • Diabetes Maternal Grandmother    • Skin cancer Sister    • Alcohol abuse Sister         both sisters with hx of alcohol abuse   • Emphysema Brother            • Alcohol abuse Brother    • Alcohol abuse Sister    • Alcohol abuse Brother          due to alcohol       Social History:   reports that she has never smoked. She has never used smokeless tobacco. She reports  "that she drinks about 1.2 oz of alcohol per week. She reports that she does not use drugs.    Medications:   Medications Prior to Admission   Medication Sig Dispense Refill Last Dose   • ALPRAZolam (XANAX) 0.5 MG tablet Take 0.5 mg by mouth 3 (three) times a day as needed.     Past Month at Unknown time   • Ascorbic Acid (VITAMIN C PO) Take  by mouth.   Past Week at Unknown time   • Cholecalciferol (VITAMIN D PO) Take 5,000 Units by mouth.   Past Week at Unknown time   • dicyclomine (BENTYL) 20 MG tablet Take 1 tablet by mouth 3 (Three) Times a Day As Needed (abd pain and cramping). 90 tablet 5 Past Week at Unknown time   • estradiol (ESTRACE) 2 MG tablet Take 2 mg by mouth Daily.  12 Past Week at Unknown time   • FIBER PO Take 3 tablets by mouth Daily.   Past Month at Unknown time   • Multiple Vitamin (MULTI-VITAMIN) tablet Take by mouth.   Past Week at Unknown time   • Omega-3 Fatty Acids (OMEGA 3 PO) Take  by mouth.   Past Week at Unknown time   • Probiotic Product (PROBIOTIC PO) Take  by mouth.   Past Week at Unknown time   • SYNTHROID 50 MCG tablet Take 1 tablet by mouth Daily. 90 tablet 3 11/17/2019 at Unknown time   • albuterol (PROVENTIL HFA;VENTOLIN HFA) 108 (90 Base) MCG/ACT inhaler Inhale 2 puffs Every 4 (Four) Hours As Needed for Wheezing. 6.7 g 1 More than a month at Unknown time   • budesonide-formoterol (SYMBICORT) 160-4.5 MCG/ACT inhaler Inhale 2 puffs 2 (Two) Times a Day. 1 inhaler 3 More than a month at Unknown time       Allergies:  Patient has no known allergies.    ROS:    Pertinent items are noted in HPI, all other systems reviewed and negative     Objective     Blood pressure 124/74, pulse 62, resp. rate 16, height 168.9 cm (66.5\"), weight 84.9 kg (187 lb 3 oz), SpO2 98 %.    Physical Exam   Constitutional: Pt is oriented to person, place, and time and well-developed, well-nourished, and in no distress.   Mouth/Throat: Oropharynx is clear and moist.   Neck: Normal range of motion. "   Cardiovascular: Normal rate, regular rhythm and normal heart sounds.    Pulmonary/Chest: Effort normal and breath sounds normal.   Abdominal: Soft. Nontender  Skin: Skin is warm and dry.   Psychiatric: Mood, memory, affect and judgment normal.     Assessment/Plan     Diagnosis:  Recurrent diverticulitis, abdominal pain    Anticipated Surgical Procedure:  colonoscopy    The risks, benefits, and alternatives of this procedure have been discussed with the patient or the responsible party- the patient understands and agrees to proceed.

## 2019-11-19 ENCOUNTER — TELEPHONE (OUTPATIENT)
Dept: GASTROENTEROLOGY | Facility: CLINIC | Age: 49
End: 2019-11-19

## 2019-11-19 RX ORDER — METRONIDAZOLE 500 MG/1
500 TABLET ORAL 3 TIMES DAILY
Qty: 30 TABLET | Refills: 0 | Status: SHIPPED | OUTPATIENT
Start: 2019-11-19 | End: 2019-11-19 | Stop reason: SDUPTHER

## 2019-11-19 RX ORDER — METRONIDAZOLE 500 MG/1
500 TABLET ORAL 3 TIMES DAILY
Qty: 30 TABLET | Refills: 0 | Status: SHIPPED | OUTPATIENT
Start: 2019-11-19 | End: 2019-11-29

## 2019-11-19 NOTE — TELEPHONE ENCOUNTER
----- Message from Keron Galaviz MD sent at 11/18/2019  9:52 PM EST -----  Regarding: flagyl  She called answering service never received her Flagyl, please call in Flagyl 500 mg p.o. 3 times daily for 10 days, #30    I told her she would have a called in tomorrow morning and she can pick them up tomorrow afternoon she was fine with that

## 2019-11-20 LAB
CYTO UR: NORMAL
LAB AP CASE REPORT: NORMAL
PATH REPORT.FINAL DX SPEC: NORMAL
PATH REPORT.GROSS SPEC: NORMAL

## 2019-11-21 ENCOUNTER — TELEPHONE (OUTPATIENT)
Dept: GASTROENTEROLOGY | Facility: CLINIC | Age: 49
End: 2019-11-21

## 2019-11-21 DIAGNOSIS — K57.32 DIVERTICULITIS OF LARGE INTESTINE WITHOUT PERFORATION OR ABSCESS WITHOUT BLEEDING: Primary | ICD-10-CM

## 2019-11-21 NOTE — TELEPHONE ENCOUNTER
Called pt and advised per Dr Ward that her referral has been placed .  Dr Joseph's office and will contact her for appt. Pt verb understanding.

## 2019-11-21 NOTE — TELEPHONE ENCOUNTER
----- Message from Dhara Brown sent at 11/21/2019 11:14 AM EST -----  Regarding: Question   Contact: [phone]  Pt is calling regarding the referral to Dr Cintia Joseph, Dr Ward told her.

## 2019-11-22 ENCOUNTER — TELEPHONE (OUTPATIENT)
Dept: GASTROENTEROLOGY | Facility: CLINIC | Age: 49
End: 2019-11-22

## 2019-12-10 NOTE — PROGRESS NOTES
"SURGERY  Rita Kemp   1970  12/11/19    Chief Complaint:  diverticulitis    HPI    Ms. Kemp is a very nice 49 y.o. female referred by Dr Gisela Ward who presents with diverticulitis, recurrent.  She describes that she actually had her first attack when she was in her 30s, and has intermittently had attacks almost yearly every year in October oddly.  However this year it has been a few years since she had 1 and it occurred in July but it occurred his 2 bouts.  It feels like a knife stabbing into her sad.  She was treated with 4 rounds of antibiotics, and followed up with a colonoscopy by Dr. Ward, which despite those antibiotics still showed peridiverticular erythema and ulceration at the 35 cm site, with 's comment that she \"still had infection\".  Incidentally she had pandiverticulosis.    Her imaging includes a CT done 7/8 that showed wall thickening iwht pericolic fat stranding involving the distal left colon in the region of diverticula, most consistent with diverticulitis.  This was in the descending colon, by CT about at the level of the umbilicus.  She has a very redundant colon in the sigmoid/rectum, but happily has a transverse that drapes down so that the colon would be sufficient to do so.  The colon is very intimately approximated to the spleen.  Repeat CT in 10/25/19 showed resolution.  I reviewed the CT with she and her  to show her all the aspects of the site of the inflammation and the need to do splenic flexure and the difficulty associated with that.    Curiously, her mother, 2 sisters, and grandmother have all had problems with pretty severe diverticulitis.    In discussing surgery, she comments that she probably will need more pain medication postoperatively the most people, this just being the case in the past.    Past Medical History:   Diagnosis Date   • Anxiety    • Asthma    • Depression    • Disease of thyroid gland    • Diverticulitis    • Diverticulitis of " colon 2006 and 2019   • History of normal mammogram 2015     Past Surgical History:   Procedure Laterality Date   • COLONOSCOPY N/A 10/24/2006    diverticulitis   Cande ALVARADO   • COLONOSCOPY N/A 2018    Diverticulosis, Non-bleeding internal hemorrhoids, No specimens collected.    • COLONOSCOPY N/A 2019    Procedure: COLONOSCOPY to cecum with cold snare polypectomy;  Surgeon: Gisela Ward MD;  Location: [facility] ENDOSCOPY;  Service: Gastroenterology   • CYSTOSCOPY N/A 09/15/2009    Dr. Alize Mcginnis   • ORIF ANKLE FRACTURE Right 02/10/2014    Right ankle posterior malleolus fracture - Gianfranco Teran   • TIBIA IM NAILING/RODDING Right 02/10/2014    Operative fixation w/ intramedullary nailing right tibial shaft fracture - Gianfranco Teran   • TOTAL LAPAROSCOPIC HYSTERECTOMY SALPINGO OOPHORECTOMY Bilateral 09/15/2009    Dr. Alize Mcginnis     Family History   Problem Relation Age of Onset   • Coronary artery disease Mother    • Macular degeneration Mother    • Thyroid disease Mother    • Heart disease Mother    • Skin cancer Mother    • Coronary artery disease Father    • Hypertension Father    • Diabetes type II Father    • Heart disease Father    • Colon polyps Father            • Diabetes Maternal Grandmother    • Skin cancer Sister    • Alcohol abuse Sister         both sisters with hx of alcohol abuse   • Emphysema Brother            • Alcohol abuse Brother    • Alcohol abuse Sister    • Alcohol abuse Brother          due to alcohol     Social History     Socioeconomic History   • Marital status:      Spouse name: Suhas Kemp   • Number of children: 0   • Years of education: Not on file   • Highest education level: Not on file   Occupational History   • Occupation:  HR   Tobacco Use   • Smoking status: Never Smoker   • Smokeless tobacco: Never Used   Substance and Sexual Activity   • Alcohol use: Yes      "Alcohol/week: 2.0 standard drinks     Types: 2 Standard drinks or equivalent per week     Comment: Vodka & Tonic on the weekends   • Drug use: No   • Sexual activity: Defer         Current Outpatient Medications:   •  Ascorbic Acid (VITAMIN C PO), Take 1 tablet by mouth Daily., Disp: , Rfl:   •  Cholecalciferol (VITAMIN D PO), Take 5,000 Units by mouth Daily., Disp: , Rfl:   •  Cyanocobalamin (VITAMIN B-12 PO), Take 1 tablet by mouth Daily., Disp: , Rfl:   •  estradiol (ESTRACE) 2 MG tablet, Take 2 mg by mouth Daily., Disp: , Rfl: 12  •  FIBER PO, Take 3 tablets by mouth Daily., Disp: , Rfl:   •  Multiple Vitamin (MULTI-VITAMIN) tablet, Take 1 tablet by mouth Daily., Disp: , Rfl:   •  Probiotic Product (PROBIOTIC PO), Take 1 tablet by mouth Daily., Disp: , Rfl:   •  SYNTHROID 50 MCG tablet, Take 1 tablet by mouth Daily., Disp: 90 tablet, Rfl: 3  •  TURMERIC PO, Take 1 tablet by mouth Daily., Disp: , Rfl:   •  albuterol (PROVENTIL HFA;VENTOLIN HFA) 108 (90 Base) MCG/ACT inhaler, Inhale 2 puffs Every 4 (Four) Hours As Needed for Wheezing., Disp: 6.7 g, Rfl: 1  •  ALPRAZolam (XANAX) 0.5 MG tablet, Take 0.5 mg by mouth 3 (three) times a day as needed.  , Disp: , Rfl:   •  budesonide-formoterol (SYMBICORT) 160-4.5 MCG/ACT inhaler, Inhale 2 puffs 2 (Two) Times a Day. (Patient taking differently: Inhale 2 puffs As Needed.), Disp: 1 inhaler, Rfl: 3  •  dicyclomine (BENTYL) 20 MG tablet, Take 1 tablet by mouth 3 (Three) Times a Day As Needed (abd pain and cramping)., Disp: 90 tablet, Rfl: 5  •  Omega-3 Fatty Acids (OMEGA 3 PO), Take 1 tablet by mouth Daily., Disp: , Rfl:     No Known Allergies  Review of Systems  Positive for abdominal pain.  Negative for chest pain or shortness of breath  All other systems reviewed and negative.    Vitals:    12/11/19 1434   Pulse: 86   SpO2: 98%   Weight: 85.8 kg (189 lb 3.2 oz)   Height: 167.6 cm (66\")       PHYSICAL EXAM:    Pulse 86   Ht 167.6 cm (66\")   Wt 85.8 kg (189 lb 3.2 oz)   " SpO2 98%   BMI 30.54 kg/m²   Body mass index is 30.54 kg/m².    Constitutional: well developed, well nourished, appears  healthy, stated age  Eyes: sclera nonicteric, conjunctiva not injected   ENMT: Hearing intact, trachea midline, dentitia in good order  CVS: RRR, no murmur, peripheral edema not present  Respiratory: CTA, normal respiratory effort   Gastrointestinal: no hepatosplenomegaly, abdomen soft, tender at the iliac crest and just above that with some mild guarding no rebound, abdominal hernia not detected  Genitourinary: inguinal hernia not present  Musculoskeletal: gait normal, muscle mass normal  Skin: warm and dry, no rashes visible  Neurological: awake and alert, seems to have reasonable capacity for understanding for medical decision making  Psychiatric: appears to have reasonable judgement, pleasant  Lymphatics: no cervical adenopathy    Radiographic/Lab Findings: Hemoglobin 11.8    Pamphlet reviewed: Colon resection    IMPRESSION:  · Left lower quadrant pain with diverticulitis by CT, mid-to distal descending.   This location is problematic given the need to do a complete resection to the rectum but also to maintain blood supply so we will required extensive mobilization around the splenic flexure.  · Strong family history of diverticulitis  · Hypothyroidism on Synthroid  · Vocalize concern for adequacy of pain control..  Normal    PLAN:  · Laparoscopic low anterior resection with splenic flexure mobilization.  Discussed the need to go around the spleen and the potential of splenic injury.  Discussed the risk including a 5% risk of need for a colostomy due to a leak.  · Lorenzo for colostomy and PAT though I think there is very little risk of that occurring during the initial surgery, certainly less than 5%  · ERAS protocol with tap block, gabapentin, Tylenol, Entereg.  · SCDs, tap block.  Preoperative antibiotics IV   · We discussed the option of not proceeding with surgery, which will lead to I  think progressively worse disease which will be more difficult to resect.  · MiraLAX prep day prior with neomycin and erythromycin.    Cintia Joseph MD  12/11/19  5:27 PM

## 2019-12-11 ENCOUNTER — PREP FOR SURGERY (OUTPATIENT)
Dept: OTHER | Facility: HOSPITAL | Age: 49
End: 2019-12-11

## 2019-12-11 ENCOUNTER — OFFICE VISIT (OUTPATIENT)
Dept: SURGERY | Facility: CLINIC | Age: 49
End: 2019-12-11

## 2019-12-11 VITALS — HEIGHT: 66 IN | HEART RATE: 86 BPM | WEIGHT: 189.2 LBS | BODY MASS INDEX: 30.41 KG/M2 | OXYGEN SATURATION: 98 %

## 2019-12-11 DIAGNOSIS — K57.32 DIVERTICULITIS OF LARGE INTESTINE WITHOUT PERFORATION OR ABSCESS WITHOUT BLEEDING: Primary | ICD-10-CM

## 2019-12-11 PROCEDURE — 99244 OFF/OP CNSLTJ NEW/EST MOD 40: CPT | Performed by: SURGERY

## 2019-12-11 RX ORDER — ERYTHROMYCIN 500 MG/1
TABLET, COATED ORAL
Qty: 6 TABLET | Refills: 0 | Status: ON HOLD | OUTPATIENT
Start: 2019-12-11 | End: 2020-01-15

## 2019-12-11 RX ORDER — ACETAMINOPHEN 500 MG
1000 TABLET ORAL EVERY 6 HOURS
Status: CANCELLED | OUTPATIENT
Start: 2020-01-15

## 2019-12-11 RX ORDER — CEFAZOLIN SODIUM 2 G/100ML
2 INJECTION, SOLUTION INTRAVENOUS ONCE
Status: CANCELLED | OUTPATIENT
Start: 2020-01-15 | End: 2019-12-11

## 2019-12-11 RX ORDER — CELECOXIB 200 MG/1
200 CAPSULE ORAL EVERY 12 HOURS SCHEDULED
Status: CANCELLED | OUTPATIENT
Start: 2020-01-15

## 2019-12-11 RX ORDER — GABAPENTIN 300 MG/1
300 CAPSULE ORAL 3 TIMES DAILY
Status: CANCELLED | OUTPATIENT
Start: 2020-01-15

## 2019-12-11 RX ORDER — ALVIMOPAN 12 MG/1
12 CAPSULE ORAL 2 TIMES DAILY
Status: CANCELLED | OUTPATIENT
Start: 2020-01-15 | End: 2020-01-17

## 2019-12-11 RX ORDER — NEOMYCIN SULFATE 500 MG/1
1000 TABLET ORAL 3 TIMES DAILY
Qty: 6 TABLET | Refills: 0 | Status: SHIPPED | OUTPATIENT
Start: 2019-12-11 | End: 2019-12-12

## 2019-12-11 RX ORDER — CHLORHEXIDINE GLUCONATE 4 G/100ML
SOLUTION TOPICAL 2 TIMES DAILY
Qty: 236 ML | Refills: 0 | Status: SHIPPED | OUTPATIENT
Start: 2019-12-11 | End: 2020-01-08

## 2019-12-11 RX ORDER — SCOLOPAMINE TRANSDERMAL SYSTEM 1 MG/1
1 PATCH, EXTENDED RELEASE TRANSDERMAL CONTINUOUS
Status: CANCELLED | OUTPATIENT
Start: 2020-01-15 | End: 2020-01-18

## 2019-12-17 ENCOUNTER — TELEPHONE (OUTPATIENT)
Dept: SURGERY | Facility: CLINIC | Age: 49
End: 2019-12-17

## 2019-12-17 NOTE — TELEPHONE ENCOUNTER
Patient called stating that she did not receive her paper Rx on 12/11 for the Hibiclens that she needs to use prior to surgery.   Called in the following Rx to [facility] in [place]:  Chlorhexidine 4 % external liquid, Shower With Hibiclens Solution Twice The Day Before Surgery, dispense quantity of 236 mL, NR.

## 2020-01-08 ENCOUNTER — APPOINTMENT (OUTPATIENT)
Dept: PREADMISSION TESTING | Facility: HOSPITAL | Age: 50
End: 2020-01-08

## 2020-01-08 VITALS
TEMPERATURE: 98 F | HEIGHT: 66 IN | WEIGHT: 189.2 LBS | RESPIRATION RATE: 16 BRPM | SYSTOLIC BLOOD PRESSURE: 127 MMHG | OXYGEN SATURATION: 100 % | DIASTOLIC BLOOD PRESSURE: 85 MMHG | BODY MASS INDEX: 30.41 KG/M2 | HEART RATE: 87 BPM

## 2020-01-08 DIAGNOSIS — K57.32 DIVERTICULITIS OF LARGE INTESTINE WITHOUT PERFORATION OR ABSCESS WITHOUT BLEEDING: ICD-10-CM

## 2020-01-08 LAB
ABO GROUP BLD: NORMAL
ANION GAP SERPL CALCULATED.3IONS-SCNC: 12.7 MMOL/L (ref 5–15)
BLD GP AB SCN SERPL QL: NEGATIVE
BUN BLD-MCNC: 9 MG/DL (ref 6–20)
BUN/CREAT SERPL: 13.4 (ref 7–25)
CALCIUM SPEC-SCNC: 9.3 MG/DL (ref 8.6–10.5)
CHLORIDE SERPL-SCNC: 100 MMOL/L (ref 98–107)
CO2 SERPL-SCNC: 25.3 MMOL/L (ref 22–29)
CREAT BLD-MCNC: 0.67 MG/DL (ref 0.57–1)
DEPRECATED RDW RBC AUTO: 39 FL (ref 37–54)
ERYTHROCYTE [DISTWIDTH] IN BLOOD BY AUTOMATED COUNT: 12 % (ref 12.3–15.4)
GFR SERPL CREATININE-BSD FRML MDRD: 93 ML/MIN/1.73
GLUCOSE BLD-MCNC: 118 MG/DL (ref 65–99)
HCT VFR BLD AUTO: 38.1 % (ref 34–46.6)
HGB BLD-MCNC: 13.1 G/DL (ref 12–15.9)
MCH RBC QN AUTO: 30.3 PG (ref 26.6–33)
MCHC RBC AUTO-ENTMCNC: 34.4 G/DL (ref 31.5–35.7)
MCV RBC AUTO: 88 FL (ref 79–97)
PLATELET # BLD AUTO: 237 10*3/MM3 (ref 140–450)
PMV BLD AUTO: 10.5 FL (ref 6–12)
POTASSIUM BLD-SCNC: 3.6 MMOL/L (ref 3.5–5.2)
RBC # BLD AUTO: 4.33 10*6/MM3 (ref 3.77–5.28)
RH BLD: POSITIVE
SODIUM BLD-SCNC: 138 MMOL/L (ref 136–145)
T&S EXPIRATION DATE: NORMAL
WBC NRBC COR # BLD: 12.31 10*3/MM3 (ref 3.4–10.8)

## 2020-01-08 PROCEDURE — 36415 COLL VENOUS BLD VENIPUNCTURE: CPT

## 2020-01-08 PROCEDURE — 86901 BLOOD TYPING SEROLOGIC RH(D): CPT | Performed by: SURGERY

## 2020-01-08 PROCEDURE — 93005 ELECTROCARDIOGRAM TRACING: CPT

## 2020-01-08 PROCEDURE — 93010 ELECTROCARDIOGRAM REPORT: CPT | Performed by: INTERNAL MEDICINE

## 2020-01-08 PROCEDURE — 86850 RBC ANTIBODY SCREEN: CPT | Performed by: SURGERY

## 2020-01-08 PROCEDURE — 85027 COMPLETE CBC AUTOMATED: CPT | Performed by: SURGERY

## 2020-01-08 PROCEDURE — 80048 BASIC METABOLIC PNL TOTAL CA: CPT | Performed by: SURGERY

## 2020-01-08 PROCEDURE — 86900 BLOOD TYPING SEROLOGIC ABO: CPT | Performed by: SURGERY

## 2020-01-08 RX ORDER — CHLORHEXIDINE GLUCONATE 500 MG/1
1 CLOTH TOPICAL TAKE AS DIRECTED
COMMUNITY
End: 2020-01-17 | Stop reason: HOSPADM

## 2020-01-08 NOTE — DISCHARGE INSTRUCTIONS
Take the following medications the morning of surgery:  SYMBICORT, ALBUTEROL    Arrive to hospital on your day of surgery at 11:00 AM.    General Instructions:  • Do not eat solid food after midnight the night before surgery.  • You may drink clear liquids day of surgery but must stop at least one hour before your hospital arrival time.  • It is beneficial for you to have a clear drink that contains carbohydrates the day of surgery.  We suggest a 12 to 20 ounce bottle of Gatorade or Powerade for non-diabetic patients or a 12 to 20 ounce bottle of G2 or Powerade Zero for diabetic patients. (Pediatric patients, are not advised to drink a 12 to 20 ounce carbohydrate drink)    Clear liquids are liquids you can see through.  Nothing red in color.     Plain water                               Sports drinks  Sodas                                   Gelatin (Jell-O)  Fruit juices without pulp such as white grape juice and apple juice  Popsicles that contain no fruit or yogurt  Tea or coffee (no cream or milk added)  Gatorade / Powerade  G2 / Powerade Zero    • Infants may have breast milk up to four hours before surgery.  • Infants drinking formula may drink formula up to six hours before surgery.   • Patients who avoid smoking, chewing tobacco and alcohol for 4 weeks prior to surgery have a reduced risk of post-operative complications.  Quit smoking as many days before surgery as you can.  • Do not smoke, use chewing tobacco or drink alcohol the day of surgery.   • If applicable bring your C-PAP/ BI-PAP machine.  • Bring any papers given to you in the doctor’s office.  • Wear clean comfortable clothes.  • Do not wear contact lenses, false eyelashes or make-up.  Bring a case for your glasses.   • Bring crutches or walker if applicable.  • Remove all piercings.  Leave jewelry and any other valuables at home.  • Hair extensions with metal clips must be removed prior to surgery.  • The Pre-Admission Testing nurse will instruct  you to bring medications if unable to obtain an accurate list in Pre-Admission Testing.        If you were given a blood bank ID arm band remember to bring it with you the day of surgery.    Preventing a Surgical Site Infection:  • For 2 to 3 days before surgery, avoid shaving with a razor because the razor can irritate skin and make it easier to develop an infection.    • Any areas of open skin can increase the risk of a post-operative wound infection by allowing bacteria to enter and travel throughout the body.  Notify your surgeon if you have any skin wounds / rashes even if it is not near the expected surgical site.  The area will need assessed to determine if surgery should be delayed until it is healed.  • The night prior to surgery sleep in a clean bed with clean clothing.  Do not allow pets to sleep with you.  • Shower on the morning of surgery using a fresh bar of anti-bacterial soap (such as Dial) and clean washcloth.  Dry with a clean towel and dress in clean clothing.  • Ask your surgeon if you will be receiving antibiotics prior to surgery.  • Make sure you, your family, and all healthcare providers clean their hands with soap and water or an alcohol based hand  before caring for you or your wound.    Day of surgery:  Your arrival time is approximately two hours before your scheduled surgery time.  Upon arrival, a Pre-op nurse and Anesthesiologist will review your health history, obtain vital signs, and answer questions you may have.  The only belongings needed at this time will be a list of your home medications and if applicable your C-PAP/BI-PAP machine.  If you are staying overnight your family can leave the rest of your belongings in the car and bring them to your room later.  A Pre-op nurse will start an IV and you may receive medication in preparation for surgery, including something to help you relax.  Your family will be able to see you in the Pre-op area.  Two visitors at a time will  be allowed in the Pre-op room.  While you are in surgery your family should notify the waiting room  if they leave the waiting room area and provide a contact phone number.    Please be aware that surgery does come with discomfort.  We want to make every effort to control your discomfort so please discuss any uncontrolled symptoms with your nurse.   Your doctor will most likely have prescribed pain medications.      If you are going home after surgery you will receive individualized written care instructions before being discharged.  A responsible adult must drive you to and from the hospital on the day of your surgery and stay with you for 24 hours.    If you are staying overnight following surgery, you will be transported to your hospital room following the recovery period.  [facility] has all private rooms.    If you have any questions please call Pre-Admission Testing at [phone].  Deductibles and co-payments are collected on the day of service. Please be prepared to pay the required co-pay, deductible or deposit on the day of service as defined by your plan.  2% CHLORHEXIDINE GLUCONATE* CLOTH  Preparing or “prepping” skin before surgery can reduce the risk of infection at the surgical site. To make the process easier, [facility] has chosen disposable cloths moistened with a rinse-free, 2% Chlorhexidine Gluconate (CHG) antiseptic solution. The steps below outline the prepping process and should be carefully followed.        Use the prep cloth on the area that is circled in the diagram             Directions Night before Surgery  1) Shower using a fresh bar of anti-bacterial soap (such as Dial) and clean washcloth.  Use a clean towel to completely dry your skin.  2) Do not use any lotions, oils or creams on your skin.  3) Open the package and remove 1 cloth, wipe your skin for 30 seconds in a circular motion.  Allow to dry for 3 minutes.  4) Repeat #3 with second  cloth.  5) Do not touch your eyes, ears, or mouth with the prep cloth.  6) Allow the wet prep solution to air dry.  7) Discard the prep cloth and wash your hands with soap and water.   8) Dress in clean bed clothes and sleep on fresh clean bed sheets.   9) You may experience some temporary itching after the prep.    Directions Day of Surgery  1) Repeat steps 1,2,3,4,5,6,7, and 9.   2) Dress in clean clothes before coming to the hospital.

## 2020-01-08 NOTE — NURSING NOTE
01/08/20 1620   Stoma Site Marking   Procedure Marking For ileostomy;colostomy   Site Marked RLQ: right lower quadrant;LLQ: left lower quadrant   Patient Assessment Screen waistband avoided;creases/scars avoided;marked within patient's visual field;rectus muscle identified   How Was Patient Marked? surgical marker;transparent dressing   Patient Position During Marking standing;sitting

## 2020-01-15 ENCOUNTER — ANESTHESIA EVENT (OUTPATIENT)
Dept: PERIOP | Facility: HOSPITAL | Age: 50
End: 2020-01-15

## 2020-01-15 ENCOUNTER — ANESTHESIA (OUTPATIENT)
Dept: PERIOP | Facility: HOSPITAL | Age: 50
End: 2020-01-15

## 2020-01-15 ENCOUNTER — HOSPITAL ENCOUNTER (INPATIENT)
Facility: HOSPITAL | Age: 50
LOS: 2 days | Discharge: HOME | End: 2020-01-17
Attending: SURGERY | Admitting: SURGERY

## 2020-01-15 DIAGNOSIS — K57.32 DIVERTICULITIS OF LARGE INTESTINE WITHOUT PERFORATION OR ABSCESS WITHOUT BLEEDING: ICD-10-CM

## 2020-01-15 LAB
ANION GAP SERPL CALCULATED.3IONS-SCNC: 16.8 MMOL/L (ref 5–15)
BASOPHILS # BLD AUTO: 0.03 10*3/MM3 (ref 0–0.2)
BASOPHILS NFR BLD AUTO: 0.2 % (ref 0–1.5)
BUN BLD-MCNC: 7 MG/DL (ref 6–20)
BUN/CREAT SERPL: 9.2 (ref 7–25)
CALCIUM SPEC-SCNC: 8.4 MG/DL (ref 8.6–10.5)
CHLORIDE SERPL-SCNC: 98 MMOL/L (ref 98–107)
CO2 SERPL-SCNC: 21.2 MMOL/L (ref 22–29)
CREAT BLD-MCNC: 0.76 MG/DL (ref 0.57–1)
DEPRECATED RDW RBC AUTO: 38.5 FL (ref 37–54)
EOSINOPHIL # BLD AUTO: 0.01 10*3/MM3 (ref 0–0.4)
EOSINOPHIL NFR BLD AUTO: 0.1 % (ref 0.3–6.2)
ERYTHROCYTE [DISTWIDTH] IN BLOOD BY AUTOMATED COUNT: 12.1 % (ref 12.3–15.4)
GFR SERPL CREATININE-BSD FRML MDRD: 80 ML/MIN/1.73
GLUCOSE BLD-MCNC: 145 MG/DL (ref 65–99)
HCT VFR BLD AUTO: 34.6 % (ref 34–46.6)
HGB BLD-MCNC: 11.8 G/DL (ref 12–15.9)
IMM GRANULOCYTES # BLD AUTO: 0.05 10*3/MM3 (ref 0–0.05)
IMM GRANULOCYTES NFR BLD AUTO: 0.4 % (ref 0–0.5)
LYMPHOCYTES # BLD AUTO: 0.78 10*3/MM3 (ref 0.7–3.1)
LYMPHOCYTES NFR BLD AUTO: 5.6 % (ref 19.6–45.3)
MCH RBC QN AUTO: 30.2 PG (ref 26.6–33)
MCHC RBC AUTO-ENTMCNC: 34.1 G/DL (ref 31.5–35.7)
MCV RBC AUTO: 88.5 FL (ref 79–97)
MONOCYTES # BLD AUTO: 0.36 10*3/MM3 (ref 0.1–0.9)
MONOCYTES NFR BLD AUTO: 2.6 % (ref 5–12)
NEUTROPHILS # BLD AUTO: 12.71 10*3/MM3 (ref 1.7–7)
NEUTROPHILS NFR BLD AUTO: 91.1 % (ref 42.7–76)
NRBC BLD AUTO-RTO: 0 /100 WBC (ref 0–0.2)
PLATELET # BLD AUTO: 227 10*3/MM3 (ref 140–450)
PMV BLD AUTO: 10.5 FL (ref 6–12)
POTASSIUM BLD-SCNC: 3.8 MMOL/L (ref 3.5–5.2)
RBC # BLD AUTO: 3.91 10*6/MM3 (ref 3.77–5.28)
SODIUM BLD-SCNC: 136 MMOL/L (ref 136–145)
WBC NRBC COR # BLD: 13.94 10*3/MM3 (ref 3.4–10.8)

## 2020-01-15 PROCEDURE — 80048 BASIC METABOLIC PNL TOTAL CA: CPT | Performed by: SURGERY

## 2020-01-15 PROCEDURE — 25010000002 HYDROMORPHONE PER 4 MG: Performed by: NURSE ANESTHETIST, CERTIFIED REGISTERED

## 2020-01-15 PROCEDURE — 0DBM4ZZ EXCISION OF DESCENDING COLON, PERCUTANEOUS ENDOSCOPIC APPROACH: ICD-10-PCS | Performed by: SURGERY

## 2020-01-15 PROCEDURE — 25010000002 HYDROMORPHONE PER 4 MG: Performed by: ANESTHESIOLOGY

## 2020-01-15 PROCEDURE — 44207 L COLECTOMY/COLOPROCTOSTOMY: CPT | Performed by: SURGERY

## 2020-01-15 PROCEDURE — 85025 COMPLETE CBC W/AUTO DIFF WBC: CPT | Performed by: SURGERY

## 2020-01-15 PROCEDURE — P9041 ALBUMIN (HUMAN),5%, 50ML: HCPCS | Performed by: ANESTHESIOLOGY

## 2020-01-15 PROCEDURE — 44213 LAP MOBIL SPLENIC FL ADD-ON: CPT | Performed by: SURGERY

## 2020-01-15 PROCEDURE — C9290 INJ, BUPIVACAINE LIPOSOME: HCPCS | Performed by: ANESTHESIOLOGY

## 2020-01-15 PROCEDURE — 44213 LAP MOBIL SPLENIC FL ADD-ON: CPT | Performed by: PHYSICIAN ASSISTANT

## 2020-01-15 PROCEDURE — 25010000002 MIDAZOLAM PER 1 MG: Performed by: ANESTHESIOLOGY

## 2020-01-15 PROCEDURE — 25010000003 BUPIVACAINE LIPOSOME 1.3 % SUSPENSION: Performed by: ANESTHESIOLOGY

## 2020-01-15 PROCEDURE — 25010000002 DEXAMETHASONE PER 1 MG: Performed by: ANESTHESIOLOGY

## 2020-01-15 PROCEDURE — 94640 AIRWAY INHALATION TREATMENT: CPT

## 2020-01-15 PROCEDURE — 25010000002 MAGNESIUM SULFATE PER 500 MG OF MAGNESIUM: Performed by: NURSE ANESTHETIST, CERTIFIED REGISTERED

## 2020-01-15 PROCEDURE — 0DTN4ZZ RESECTION OF SIGMOID COLON, PERCUTANEOUS ENDOSCOPIC APPROACH: ICD-10-PCS | Performed by: SURGERY

## 2020-01-15 PROCEDURE — 88307 TISSUE EXAM BY PATHOLOGIST: CPT | Performed by: SURGERY

## 2020-01-15 PROCEDURE — 44207 L COLECTOMY/COLOPROCTOSTOMY: CPT | Performed by: PHYSICIAN ASSISTANT

## 2020-01-15 PROCEDURE — 94799 UNLISTED PULMONARY SVC/PX: CPT

## 2020-01-15 PROCEDURE — 25010000002 LIDOCAINE PER 10 MG: Performed by: NURSE ANESTHETIST, CERTIFIED REGISTERED

## 2020-01-15 PROCEDURE — 0DBP4ZZ EXCISION OF RECTUM, PERCUTANEOUS ENDOSCOPIC APPROACH: ICD-10-PCS | Performed by: SURGERY

## 2020-01-15 PROCEDURE — 25010000002 NEOSTIGMINE PER 0.5 MG: Performed by: ANESTHESIOLOGY

## 2020-01-15 PROCEDURE — 25010000002 ONDANSETRON PER 1 MG: Performed by: ANESTHESIOLOGY

## 2020-01-15 PROCEDURE — 25010000003 CEFAZOLIN IN DEXTROSE 2-4 GM/100ML-% SOLUTION: Performed by: SURGERY

## 2020-01-15 PROCEDURE — 25010000002 PROPOFOL 10 MG/ML EMULSION: Performed by: NURSE ANESTHETIST, CERTIFIED REGISTERED

## 2020-01-15 PROCEDURE — 25010000002 ALBUMIN HUMAN 5% PER 50 ML: Performed by: ANESTHESIOLOGY

## 2020-01-15 PROCEDURE — 25010000002 FENTANYL CITRATE (PF) 100 MCG/2ML SOLUTION: Performed by: NURSE ANESTHETIST, CERTIFIED REGISTERED

## 2020-01-15 PROCEDURE — 25010000002 KETOROLAC TROMETHAMINE PER 15 MG: Performed by: SURGERY

## 2020-01-15 DEVICE — CLIP LIGAT VASC HORIZON TI MD/LG GRN 6CT: Type: IMPLANTABLE DEVICE | Site: ABDOMEN | Status: FUNCTIONAL

## 2020-01-15 DEVICE — ENDOSCOPIC LINEAR CUTTER RELOADS GRAY 2.0MM, 6 ROWS
Type: IMPLANTABLE DEVICE | Site: ABDOMEN | Status: FUNCTIONAL
Brand: ECHELON ENDOPATH

## 2020-01-15 DEVICE — CURVED INTRALUMINAL STAPLER (ILS) 24 TITANIUM ADJUSTABLE HEIGHT STAPLES: Type: IMPLANTABLE DEVICE | Site: ABDOMEN | Status: FUNCTIONAL

## 2020-01-15 DEVICE — ENDOPATH ECHELON ENDOSCOPIC LINEAR CUTTER RELOADS, BLUE, 60MM
Type: IMPLANTABLE DEVICE | Site: ABDOMEN | Status: FUNCTIONAL
Brand: ECHELON ENDOPATH

## 2020-01-15 RX ORDER — HYDROMORPHONE HYDROCHLORIDE 1 MG/ML
0.25 INJECTION, SOLUTION INTRAMUSCULAR; INTRAVENOUS; SUBCUTANEOUS
Status: DISCONTINUED | OUTPATIENT
Start: 2020-01-15 | End: 2020-01-17 | Stop reason: HOSPADM

## 2020-01-15 RX ORDER — NEOMYCIN SULFATE 500 MG/1
1000 TABLET ORAL TAKE AS DIRECTED
Status: ON HOLD | COMMUNITY
End: 2020-01-15

## 2020-01-15 RX ORDER — HYDROMORPHONE HCL 110MG/55ML
PATIENT CONTROLLED ANALGESIA SYRINGE INTRAVENOUS AS NEEDED
Status: DISCONTINUED | OUTPATIENT
Start: 2020-01-15 | End: 2020-01-15 | Stop reason: SURG

## 2020-01-15 RX ORDER — DEXTROSE MONOHYDRATE, SODIUM CHLORIDE, SODIUM LACTATE, POTASSIUM CHLORIDE, CALCIUM CHLORIDE 5; 600; 310; 179; 20 G/100ML; MG/100ML; MG/100ML; MG/100ML; MG/100ML
50 INJECTION, SOLUTION INTRAVENOUS CONTINUOUS
Status: DISCONTINUED | OUTPATIENT
Start: 2020-01-15 | End: 2020-01-17 | Stop reason: HOSPADM

## 2020-01-15 RX ORDER — BUDESONIDE AND FORMOTEROL FUMARATE DIHYDRATE 160; 4.5 UG/1; UG/1
2 AEROSOL RESPIRATORY (INHALATION)
Status: DISCONTINUED | OUTPATIENT
Start: 2020-01-15 | End: 2020-01-17 | Stop reason: HOSPADM

## 2020-01-15 RX ORDER — MIDAZOLAM HYDROCHLORIDE 1 MG/ML
2 INJECTION INTRAMUSCULAR; INTRAVENOUS
Status: DISCONTINUED | OUTPATIENT
Start: 2020-01-15 | End: 2020-01-15 | Stop reason: HOSPADM

## 2020-01-15 RX ORDER — LIDOCAINE HYDROCHLORIDE 10 MG/ML
0.5 INJECTION, SOLUTION EPIDURAL; INFILTRATION; INTRACAUDAL; PERINEURAL ONCE AS NEEDED
Status: DISCONTINUED | OUTPATIENT
Start: 2020-01-15 | End: 2020-01-15 | Stop reason: HOSPADM

## 2020-01-15 RX ORDER — BUPIVACAINE HYDROCHLORIDE AND EPINEPHRINE 5; 5 MG/ML; UG/ML
INJECTION, SOLUTION PERINEURAL AS NEEDED
Status: DISCONTINUED | OUTPATIENT
Start: 2020-01-15 | End: 2020-01-15 | Stop reason: HOSPADM

## 2020-01-15 RX ORDER — MAGNESIUM SULFATE HEPTAHYDRATE 500 MG/ML
INJECTION, SOLUTION INTRAMUSCULAR; INTRAVENOUS AS NEEDED
Status: DISCONTINUED | OUTPATIENT
Start: 2020-01-15 | End: 2020-01-15 | Stop reason: SURG

## 2020-01-15 RX ORDER — SODIUM CHLORIDE 0.9 % (FLUSH) 0.9 %
3 SYRINGE (ML) INJECTION EVERY 12 HOURS SCHEDULED
Status: DISCONTINUED | OUTPATIENT
Start: 2020-01-15 | End: 2020-01-15 | Stop reason: HOSPADM

## 2020-01-15 RX ORDER — GABAPENTIN 300 MG/1
300 CAPSULE ORAL 3 TIMES DAILY
Status: DISCONTINUED | OUTPATIENT
Start: 2020-01-15 | End: 2020-01-15 | Stop reason: HOSPADM

## 2020-01-15 RX ORDER — NALOXONE HCL 0.4 MG/ML
0.4 VIAL (ML) INJECTION
Status: DISCONTINUED | OUTPATIENT
Start: 2020-01-15 | End: 2020-01-17 | Stop reason: HOSPADM

## 2020-01-15 RX ORDER — SODIUM CHLORIDE, SODIUM LACTATE, POTASSIUM CHLORIDE, CALCIUM CHLORIDE 600; 310; 30; 20 MG/100ML; MG/100ML; MG/100ML; MG/100ML
9 INJECTION, SOLUTION INTRAVENOUS CONTINUOUS
Status: DISCONTINUED | OUTPATIENT
Start: 2020-01-15 | End: 2020-01-15

## 2020-01-15 RX ORDER — BUPIVACAINE HYDROCHLORIDE AND EPINEPHRINE 2.5; 5 MG/ML; UG/ML
INJECTION, SOLUTION EPIDURAL; INFILTRATION; INTRACAUDAL; PERINEURAL
Status: COMPLETED | OUTPATIENT
Start: 2020-01-15 | End: 2020-01-15

## 2020-01-15 RX ORDER — LIDOCAINE HYDROCHLORIDE ANHYDROUS AND DEXTROSE MONOHYDRATE 5; 400 G/100ML; MG/100ML
2 INJECTION, SOLUTION INTRAVENOUS CONTINUOUS
Status: DISCONTINUED | OUTPATIENT
Start: 2020-01-15 | End: 2020-01-15

## 2020-01-15 RX ORDER — CEFAZOLIN SODIUM 2 G/100ML
2 INJECTION, SOLUTION INTRAVENOUS EVERY 8 HOURS
Status: DISCONTINUED | OUTPATIENT
Start: 2020-01-15 | End: 2020-01-17 | Stop reason: HOSPADM

## 2020-01-15 RX ORDER — ALPRAZOLAM 0.5 MG/1
0.5 TABLET ORAL 3 TIMES DAILY PRN
Status: DISCONTINUED | OUTPATIENT
Start: 2020-01-15 | End: 2020-01-17 | Stop reason: HOSPADM

## 2020-01-15 RX ORDER — ACETAMINOPHEN 500 MG
1000 TABLET ORAL EVERY 6 HOURS
Status: DISCONTINUED | OUTPATIENT
Start: 2020-01-15 | End: 2020-01-15 | Stop reason: HOSPADM

## 2020-01-15 RX ORDER — ROCURONIUM BROMIDE 10 MG/ML
INJECTION, SOLUTION INTRAVENOUS AS NEEDED
Status: DISCONTINUED | OUTPATIENT
Start: 2020-01-15 | End: 2020-01-15 | Stop reason: SURG

## 2020-01-15 RX ORDER — NALOXONE HCL 0.4 MG/ML
0.4 VIAL (ML) INJECTION
Status: DISCONTINUED | OUTPATIENT
Start: 2020-01-15 | End: 2020-01-15 | Stop reason: HOSPADM

## 2020-01-15 RX ORDER — HYDROMORPHONE HYDROCHLORIDE 1 MG/ML
0.25 INJECTION, SOLUTION INTRAMUSCULAR; INTRAVENOUS; SUBCUTANEOUS
Status: DISCONTINUED | OUTPATIENT
Start: 2020-01-15 | End: 2020-01-15 | Stop reason: HOSPADM

## 2020-01-15 RX ORDER — FENTANYL CITRATE 50 UG/ML
INJECTION, SOLUTION INTRAMUSCULAR; INTRAVENOUS AS NEEDED
Status: DISCONTINUED | OUTPATIENT
Start: 2020-01-15 | End: 2020-01-15 | Stop reason: SURG

## 2020-01-15 RX ORDER — ALBUTEROL SULFATE 2.5 MG/3ML
2.5 SOLUTION RESPIRATORY (INHALATION) EVERY 4 HOURS PRN
Status: DISCONTINUED | OUTPATIENT
Start: 2020-01-15 | End: 2020-01-17 | Stop reason: HOSPADM

## 2020-01-15 RX ORDER — ALBUTEROL SULFATE 90 UG/1
2 AEROSOL, METERED RESPIRATORY (INHALATION) EVERY 4 HOURS PRN
Status: DISCONTINUED | OUTPATIENT
Start: 2020-01-15 | End: 2020-01-15 | Stop reason: CLARIF

## 2020-01-15 RX ORDER — METOCLOPRAMIDE HYDROCHLORIDE 5 MG/ML
10 INJECTION INTRAMUSCULAR; INTRAVENOUS ONCE AS NEEDED
Status: DISCONTINUED | OUTPATIENT
Start: 2020-01-15 | End: 2020-01-15 | Stop reason: HOSPADM

## 2020-01-15 RX ORDER — ONDANSETRON 2 MG/ML
INJECTION INTRAMUSCULAR; INTRAVENOUS AS NEEDED
Status: DISCONTINUED | OUTPATIENT
Start: 2020-01-15 | End: 2020-01-15 | Stop reason: SURG

## 2020-01-15 RX ORDER — SODIUM CHLORIDE 9 MG/ML
INJECTION, SOLUTION INTRAVENOUS AS NEEDED
Status: DISCONTINUED | OUTPATIENT
Start: 2020-01-15 | End: 2020-01-15 | Stop reason: HOSPADM

## 2020-01-15 RX ORDER — LIDOCAINE HYDROCHLORIDE 20 MG/ML
INJECTION, SOLUTION INFILTRATION; PERINEURAL AS NEEDED
Status: DISCONTINUED | OUTPATIENT
Start: 2020-01-15 | End: 2020-01-15 | Stop reason: SURG

## 2020-01-15 RX ORDER — FAMOTIDINE 20 MG/1
20 TABLET, FILM COATED ORAL 2 TIMES DAILY
Status: DISCONTINUED | OUTPATIENT
Start: 2020-01-15 | End: 2020-01-17 | Stop reason: HOSPADM

## 2020-01-15 RX ORDER — CEFAZOLIN SODIUM 2 G/100ML
2 INJECTION, SOLUTION INTRAVENOUS ONCE
Status: COMPLETED | OUTPATIENT
Start: 2020-01-15 | End: 2020-01-15

## 2020-01-15 RX ORDER — ONDANSETRON 2 MG/ML
4 INJECTION INTRAMUSCULAR; INTRAVENOUS ONCE AS NEEDED
Status: DISCONTINUED | OUTPATIENT
Start: 2020-01-15 | End: 2020-01-15 | Stop reason: HOSPADM

## 2020-01-15 RX ORDER — MIDAZOLAM HYDROCHLORIDE 1 MG/ML
1 INJECTION INTRAMUSCULAR; INTRAVENOUS
Status: DISCONTINUED | OUTPATIENT
Start: 2020-01-15 | End: 2020-01-15 | Stop reason: HOSPADM

## 2020-01-15 RX ORDER — ALBUMIN, HUMAN INJ 5% 5 %
SOLUTION INTRAVENOUS CONTINUOUS PRN
Status: DISCONTINUED | OUTPATIENT
Start: 2020-01-15 | End: 2020-01-15 | Stop reason: SURG

## 2020-01-15 RX ORDER — BUPIVACAINE HYDROCHLORIDE 5 MG/ML
INJECTION, SOLUTION EPIDURAL; INTRACAUDAL
Status: COMPLETED | OUTPATIENT
Start: 2020-01-15 | End: 2020-01-15

## 2020-01-15 RX ORDER — LEVOTHYROXINE SODIUM 0.05 MG/1
50 TABLET ORAL DAILY
Status: DISCONTINUED | OUTPATIENT
Start: 2020-01-15 | End: 2020-01-17 | Stop reason: HOSPADM

## 2020-01-15 RX ORDER — GABAPENTIN 300 MG/1
300 CAPSULE ORAL EVERY 8 HOURS SCHEDULED
Status: DISCONTINUED | OUTPATIENT
Start: 2020-01-15 | End: 2020-01-17 | Stop reason: HOSPADM

## 2020-01-15 RX ORDER — ALVIMOPAN 12 MG/1
12 CAPSULE ORAL 2 TIMES DAILY
Status: DISCONTINUED | OUTPATIENT
Start: 2020-01-15 | End: 2020-01-15 | Stop reason: HOSPADM

## 2020-01-15 RX ORDER — MAGNESIUM HYDROXIDE 1200 MG/15ML
LIQUID ORAL AS NEEDED
Status: DISCONTINUED | OUTPATIENT
Start: 2020-01-15 | End: 2020-01-15 | Stop reason: HOSPADM

## 2020-01-15 RX ORDER — KETOROLAC TROMETHAMINE 15 MG/ML
15 INJECTION, SOLUTION INTRAMUSCULAR; INTRAVENOUS EVERY 6 HOURS
Status: DISCONTINUED | OUTPATIENT
Start: 2020-01-15 | End: 2020-01-17 | Stop reason: HOSPADM

## 2020-01-15 RX ORDER — GLYCOPYRROLATE 0.2 MG/ML
INJECTION INTRAMUSCULAR; INTRAVENOUS AS NEEDED
Status: DISCONTINUED | OUTPATIENT
Start: 2020-01-15 | End: 2020-01-15 | Stop reason: SURG

## 2020-01-15 RX ORDER — FAMOTIDINE 10 MG/ML
20 INJECTION, SOLUTION INTRAVENOUS ONCE
Status: COMPLETED | OUTPATIENT
Start: 2020-01-15 | End: 2020-01-15

## 2020-01-15 RX ORDER — ALVIMOPAN 12 MG/1
12 CAPSULE ORAL 2 TIMES DAILY
Status: DISCONTINUED | OUTPATIENT
Start: 2020-01-15 | End: 2020-01-16

## 2020-01-15 RX ORDER — SCOLOPAMINE TRANSDERMAL SYSTEM 1 MG/1
1 PATCH, EXTENDED RELEASE TRANSDERMAL CONTINUOUS
Status: DISCONTINUED | OUTPATIENT
Start: 2020-01-15 | End: 2020-01-16

## 2020-01-15 RX ORDER — KETAMINE HYDROCHLORIDE 10 MG/ML
INJECTION INTRAMUSCULAR; INTRAVENOUS AS NEEDED
Status: DISCONTINUED | OUTPATIENT
Start: 2020-01-15 | End: 2020-01-15 | Stop reason: SURG

## 2020-01-15 RX ORDER — ACETAMINOPHEN 500 MG
1000 TABLET ORAL EVERY 6 HOURS
Status: DISCONTINUED | OUTPATIENT
Start: 2020-01-15 | End: 2020-01-17

## 2020-01-15 RX ORDER — PROPOFOL 10 MG/ML
VIAL (ML) INTRAVENOUS AS NEEDED
Status: DISCONTINUED | OUTPATIENT
Start: 2020-01-15 | End: 2020-01-15 | Stop reason: SURG

## 2020-01-15 RX ORDER — SODIUM CHLORIDE 0.9 % (FLUSH) 0.9 %
3-10 SYRINGE (ML) INJECTION AS NEEDED
Status: DISCONTINUED | OUTPATIENT
Start: 2020-01-15 | End: 2020-01-15 | Stop reason: HOSPADM

## 2020-01-15 RX ORDER — OXYCODONE HYDROCHLORIDE 5 MG/1
5 TABLET ORAL EVERY 4 HOURS PRN
Status: DISCONTINUED | OUTPATIENT
Start: 2020-01-15 | End: 2020-01-17 | Stop reason: HOSPADM

## 2020-01-15 RX ORDER — EPHEDRINE SULFATE 50 MG/ML
INJECTION, SOLUTION INTRAVENOUS AS NEEDED
Status: DISCONTINUED | OUTPATIENT
Start: 2020-01-15 | End: 2020-01-15 | Stop reason: SURG

## 2020-01-15 RX ORDER — DEXAMETHASONE SODIUM PHOSPHATE 10 MG/ML
INJECTION INTRAMUSCULAR; INTRAVENOUS AS NEEDED
Status: DISCONTINUED | OUTPATIENT
Start: 2020-01-15 | End: 2020-01-15 | Stop reason: SURG

## 2020-01-15 RX ORDER — CELECOXIB 200 MG/1
200 CAPSULE ORAL EVERY 12 HOURS SCHEDULED
Status: DISCONTINUED | OUTPATIENT
Start: 2020-01-15 | End: 2020-01-15 | Stop reason: HOSPADM

## 2020-01-15 RX ADMIN — HYDROMORPHONE HYDROCHLORIDE 0.25 MG: 2 INJECTION, SOLUTION INTRAMUSCULAR; INTRAVENOUS; SUBCUTANEOUS at 16:49

## 2020-01-15 RX ADMIN — KETAMINE HYDROCHLORIDE 10 MG: 10 INJECTION INTRAMUSCULAR; INTRAVENOUS at 15:30

## 2020-01-15 RX ADMIN — ALVIMOPAN 12 MG: 12 CAPSULE ORAL at 11:00

## 2020-01-15 RX ADMIN — ALVIMOPAN 12 MG: 12 CAPSULE ORAL at 22:04

## 2020-01-15 RX ADMIN — ACETAMINOPHEN 1000 MG: 500 TABLET, FILM COATED ORAL at 11:00

## 2020-01-15 RX ADMIN — KETAMINE HYDROCHLORIDE 20 MG: 10 INJECTION INTRAMUSCULAR; INTRAVENOUS at 13:26

## 2020-01-15 RX ADMIN — CELECOXIB 200 MG: 200 CAPSULE ORAL at 11:00

## 2020-01-15 RX ADMIN — CEFAZOLIN SODIUM 2 G: 2 INJECTION, SOLUTION INTRAVENOUS at 13:26

## 2020-01-15 RX ADMIN — GLYCOPYRROLATE 0.6 MG: 0.2 INJECTION INTRAMUSCULAR; INTRAVENOUS at 16:40

## 2020-01-15 RX ADMIN — FAMOTIDINE 20 MG: 20 TABLET, FILM COATED ORAL at 22:11

## 2020-01-15 RX ADMIN — MIDAZOLAM 1 MG: 1 INJECTION INTRAMUSCULAR; INTRAVENOUS at 11:15

## 2020-01-15 RX ADMIN — LIDOCAINE HYDROCHLORIDE 100 MG: 20 INJECTION, SOLUTION INFILTRATION; PERINEURAL at 13:26

## 2020-01-15 RX ADMIN — PROPOFOL 200 MG: 10 INJECTION, EMULSION INTRAVENOUS at 13:26

## 2020-01-15 RX ADMIN — DEXAMETHASONE SODIUM PHOSPHATE 8 MG: 10 INJECTION INTRAMUSCULAR; INTRAVENOUS at 16:00

## 2020-01-15 RX ADMIN — ROCURONIUM BROMIDE 20 MG: 10 INJECTION INTRAVENOUS at 15:30

## 2020-01-15 RX ADMIN — ROCURONIUM BROMIDE 40 MG: 10 INJECTION INTRAVENOUS at 13:26

## 2020-01-15 RX ADMIN — BUPIVACAINE 20 ML: 13.3 INJECTION, SUSPENSION, LIPOSOMAL INFILTRATION at 13:42

## 2020-01-15 RX ADMIN — FAMOTIDINE 20 MG: 10 INJECTION INTRAVENOUS at 11:10

## 2020-01-15 RX ADMIN — SODIUM CHLORIDE, POTASSIUM CHLORIDE, SODIUM LACTATE AND CALCIUM CHLORIDE 9 ML/HR: 600; 310; 30; 20 INJECTION, SOLUTION INTRAVENOUS at 11:09

## 2020-01-15 RX ADMIN — MIDAZOLAM 2 MG: 1 INJECTION INTRAMUSCULAR; INTRAVENOUS at 11:55

## 2020-01-15 RX ADMIN — KETAMINE HYDROCHLORIDE 10 MG: 10 INJECTION INTRAMUSCULAR; INTRAVENOUS at 14:30

## 2020-01-15 RX ADMIN — HYDROMORPHONE HYDROCHLORIDE 0.25 MG: 1 INJECTION, SOLUTION INTRAMUSCULAR; INTRAVENOUS; SUBCUTANEOUS at 18:20

## 2020-01-15 RX ADMIN — BUDESONIDE AND FORMOTEROL FUMARATE DIHYDRATE 2 PUFF: 160; 4.5 AEROSOL RESPIRATORY (INHALATION) at 21:43

## 2020-01-15 RX ADMIN — MIDAZOLAM 1 MG: 1 INJECTION INTRAMUSCULAR; INTRAVENOUS at 11:10

## 2020-01-15 RX ADMIN — BUPIVACAINE HYDROCHLORIDE AND EPINEPHRINE 10 ML: 2.5; 5 INJECTION, SOLUTION EPIDURAL; INFILTRATION; INTRACAUDAL; PERINEURAL at 13:30

## 2020-01-15 RX ADMIN — FENTANYL CITRATE 100 MCG: 50 INJECTION INTRAMUSCULAR; INTRAVENOUS at 13:43

## 2020-01-15 RX ADMIN — GABAPENTIN 300 MG: 300 CAPSULE ORAL at 11:00

## 2020-01-15 RX ADMIN — BUPIVACAINE HYDROCHLORIDE 10 ML: 5 INJECTION, SOLUTION EPIDURAL; INTRACAUDAL; PERINEURAL at 13:42

## 2020-01-15 RX ADMIN — ALBUMIN HUMAN: 0.05 INJECTION, SOLUTION INTRAVENOUS at 16:00

## 2020-01-15 RX ADMIN — HYDROMORPHONE HYDROCHLORIDE 0.25 MG: 1 INJECTION, SOLUTION INTRAMUSCULAR; INTRAVENOUS; SUBCUTANEOUS at 17:55

## 2020-01-15 RX ADMIN — MAGNESIUM SULFATE HEPTAHYDRATE 2 G: 500 INJECTION, SOLUTION INTRAMUSCULAR; INTRAVENOUS at 13:33

## 2020-01-15 RX ADMIN — KETOROLAC TROMETHAMINE 15 MG: 15 INJECTION, SOLUTION INTRAMUSCULAR; INTRAVENOUS at 22:05

## 2020-01-15 RX ADMIN — ACETAMINOPHEN 1000 MG: 500 TABLET, FILM COATED ORAL at 20:22

## 2020-01-15 RX ADMIN — ROCURONIUM BROMIDE 10 MG: 10 INJECTION INTRAVENOUS at 14:11

## 2020-01-15 RX ADMIN — BUPIVACAINE 20 ML: 13.3 INJECTION, SUSPENSION, LIPOSOMAL INFILTRATION at 13:30

## 2020-01-15 RX ADMIN — HYDROMORPHONE HYDROCHLORIDE 0.25 MG: 2 INJECTION, SOLUTION INTRAMUSCULAR; INTRAVENOUS; SUBCUTANEOUS at 16:47

## 2020-01-15 RX ADMIN — LIDOCAINE HYDROCHLORIDE ANHYDROUS AND DEXTROSE MONOHYDRATE 2 MG/MIN: .4; 5 INJECTION, SOLUTION INTRAVENOUS at 13:33

## 2020-01-15 RX ADMIN — KETAMINE HYDROCHLORIDE 10 MG: 10 INJECTION INTRAMUSCULAR; INTRAVENOUS at 16:30

## 2020-01-15 RX ADMIN — GABAPENTIN 300 MG: 300 CAPSULE ORAL at 22:11

## 2020-01-15 RX ADMIN — CEFAZOLIN SODIUM 2 G: 2 INJECTION, SOLUTION INTRAVENOUS at 22:11

## 2020-01-15 RX ADMIN — ONDANSETRON HYDROCHLORIDE 4 MG: 2 SOLUTION INTRAMUSCULAR; INTRAVENOUS at 16:36

## 2020-01-15 RX ADMIN — EPHEDRINE SULFATE 10 MG: 50 INJECTION INTRAVENOUS at 16:04

## 2020-01-15 RX ADMIN — DEXTROSE MONOHYDRATE, SODIUM CHLORIDE, SODIUM LACTATE, POTASSIUM CHLORIDE, CALCIUM CHLORIDE 50 ML/HR: 5; 600; 310; 179; 20 INJECTION, SOLUTION INTRAVENOUS at 22:04

## 2020-01-15 RX ADMIN — SCOPALAMINE 1 PATCH: 1 PATCH, EXTENDED RELEASE TRANSDERMAL at 11:00

## 2020-01-15 RX ADMIN — METRONIDAZOLE 500 MG: 500 INJECTION, SOLUTION INTRAVENOUS at 12:16

## 2020-01-15 RX ADMIN — ROCURONIUM BROMIDE 10 MG: 10 INJECTION INTRAVENOUS at 16:19

## 2020-01-15 RX ADMIN — METRONIDAZOLE 500 MG: 500 INJECTION, SOLUTION INTRAVENOUS at 23:04

## 2020-01-15 RX ADMIN — NEOSTIGMINE METHYLSULFATE 4 MG: 1 INJECTION INTRAMUSCULAR; INTRAVENOUS; SUBCUTANEOUS at 16:40

## 2020-01-15 RX ADMIN — OXYCODONE HYDROCHLORIDE 5 MG: 5 TABLET ORAL at 20:21

## 2020-01-15 NOTE — ANESTHESIA PROCEDURE NOTES
Peripheral Block    Pre-sedation assessment completed: 1/15/2020 1:30 PM    Patient reassessed immediately prior to procedure    Patient location during procedure: OR  Start time: 1/15/2020 1:30 PM  Stop time: 1/15/2020 1:40 PM  Reason for block: at surgeon's request and post-op pain management  Performed by  Anesthesiologist: Steven Apodaca MD  Preanesthetic Checklist  Completed: patient identified, site marked, surgical consent, pre-op evaluation, timeout performed, IV checked, risks and benefits discussed and monitors and equipment checked  Prep:  Sterile barriers:cap, gloves, gown, mask and sterile barriers  Prep: ChloraPrep  Patient monitoring: blood pressure monitoring, continuous pulse oximetry and EKG  Procedure  Sedation:yes    Guidance:ultrasound guided  ULTRASOUND INTERPRETATION. Using ultrasound guidance a 21 G gauge needle was placed in close proximity to the nerve, at which point, under ultrasound guidance anesthetic was injected in the area of the nerve and spread of the anesthesia was seen on ultrasound in close proximity thereto.  There were no abnormalities seen on ultrasound; a digital image was taken; and the patient tolerated the procedure with no complications. Images:still images obtained    Laterality:Bilateral  Block Type:TAP  Injection Technique:single-shot  Needle Type:echogenic  Needle Gauge:22 G      Medications Used: bupivacaine liposome (EXPAREL) 1.3 % injection, 20 mL  bupivacaine-EPINEPHrine PF (MARCAINE w/EPI) 0.25% -[id number] injection, 10 mL      Post Assessment  Injection Assessment: negative aspiration for heme, no paresthesia on injection and incremental injection  Patient Tolerance:comfortable throughout block  Complications:no

## 2020-01-15 NOTE — ANESTHESIA PROCEDURE NOTES
Airway  Urgency: elective    Date/Time: 1/15/2020 1:30 PM  Airway not difficult    General Information and Staff    Patient location during procedure: OR  Anesthesiologist: Cody Zuñiga MD  CRNA: Madhavi Toscano CRNA    Indications and Patient Condition  Indications for airway management: airway protection    Preoxygenated: yes  MILS maintained throughout  Mask difficulty assessment: 1 - vent by mask    Final Airway Details  Final airway type: endotracheal airway      Successful airway: ETT  Cuffed: yes   Successful intubation technique: direct laryngoscopy  Facilitating devices/methods: intubating stylet  Endotracheal tube insertion site: oral  Blade: Donnelly  Blade size: 2  ETT size (mm): 7.0  Cormack-Lehane Classification: grade I - full view of glottis  Placement verified by: chest auscultation and capnometry   Measured from: lips  Number of attempts at approach: 1  Assessment: lips, teeth, and gum same as pre-op and atraumatic intubation    Additional Comments  Atraumatic, Secured after verification of placement

## 2020-01-15 NOTE — H&P
"SURGERY  Rita Kemp   1970  12/11/19     Chief Complaint:  diverticulitis     HPI    Here for lap sigmoid/low anterior for recurring diverticulitis, with pain today and elevated WBC at 12.3.     Ms. Kemp is a very nice 49 y.o. female referred by Dr Gisela Ward who presents with diverticulitis, recurrent.  She describes that she actually had her first attack when she was in her 30s, and has intermittently had attacks almost yearly every year in October oddly.  However this year it has been a few years since she had 1 and it occurred in July but it occurred his 2 bouts.  It feels like a knife stabbing into her sad.  She was treated with 4 rounds of antibiotics, and followed up with a colonoscopy by Dr. Ward, which despite those antibiotics still showed peridiverticular erythema and ulceration at the 35 cm site, with 's comment that she \"still had infection\".  Incidentally she had pandiverticulosis.     Her imaging includes a CT done 7/8 that showed wall thickening iwht pericolic fat stranding involving the distal left colon in the region of diverticula, most consistent with diverticulitis.  This was in the descending colon, by CT about at the level of the umbilicus.  She has a very redundant colon in the sigmoid/rectum, but happily has a transverse that drapes down so that the colon would be sufficient to do so.  The colon is very intimately approximated to the spleen.  Repeat CT in 10/25/19 showed resolution.  I reviewed the CT with she and her  to show her all the aspects of the site of the inflammation and the need to do splenic flexure and the difficulty associated with that.     Curiously, her mother, 2 sisters, and grandmother have all had problems with pretty severe diverticulitis.     In discussing surgery, she comments that she probably will need more pain medication postoperatively the most people, this just being the case in the past.     Medical History        Past Medical " History:   Diagnosis Date   • Anxiety     • Asthma     • Depression     • Disease of thyroid gland     • Diverticulitis     • Diverticulitis of colon 2006 and 2019   • History of normal mammogram 2015         Surgical History         Past Surgical History:   Procedure Laterality Date   • COLONOSCOPY N/A 10/24/2006     diverticulitis   Cande ALVARADO   • COLONOSCOPY N/A 2018     Diverticulosis, Non-bleeding internal hemorrhoids, No specimens collected.    • COLONOSCOPY N/A 2019     Procedure: COLONOSCOPY to cecum with cold snare polypectomy;  Surgeon: Gisela Ward MD;  Location: [facility];  Service: Gastroenterology   • CYSTOSCOPY N/A 09/15/2009     Dr. Alize Mcginnis   • ORIF ANKLE FRACTURE Right 02/10/2014     Right ankle posterior malleolus fracture - Gianfranco Teran   • TIBIA IM NAILING/RODDING Right 02/10/2014     Operative fixation w/ intramedullary nailing right tibial shaft fracture - Gianfranco Teran   • TOTAL LAPAROSCOPIC HYSTERECTOMY SALPINGO OOPHORECTOMY Bilateral 09/15/2009     Dr. Alize Mcginnis               Family History   Problem Relation Age of Onset   • Coronary artery disease Mother     • Macular degeneration Mother     • Thyroid disease Mother     • Heart disease Mother     • Skin cancer Mother     • Coronary artery disease Father     • Hypertension Father     • Diabetes type II Father     • Heart disease Father     • Colon polyps Father              • Diabetes Maternal Grandmother     • Skin cancer Sister     • Alcohol abuse Sister           both sisters with hx of alcohol abuse   • Emphysema Brother              • Alcohol abuse Brother     • Alcohol abuse Sister     • Alcohol abuse Brother            due to alcohol      Social History   Social History            Socioeconomic History   • Marital status:        Spouse name: Suhas Kemp   • Number of children: 0   • Years of education: Not on file   •  Highest education level: Not on file   Occupational History   • Occupation:  HR   Tobacco Use   • Smoking status: Never Smoker   • Smokeless tobacco: Never Used   Substance and Sexual Activity   • Alcohol use: Yes       Alcohol/week: 2.0 standard drinks       Types: 2 Standard drinks or equivalent per week       Comment: Vodka & Tonic on the weekends   • Drug use: No   • Sexual activity: Defer               Current Outpatient Medications:   •  Ascorbic Acid (VITAMIN C PO), Take 1 tablet by mouth Daily., Disp: , Rfl:   •  Cholecalciferol (VITAMIN D PO), Take 5,000 Units by mouth Daily., Disp: , Rfl:   •  Cyanocobalamin (VITAMIN B-12 PO), Take 1 tablet by mouth Daily., Disp: , Rfl:   •  estradiol (ESTRACE) 2 MG tablet, Take 2 mg by mouth Daily., Disp: , Rfl: 12  •  FIBER PO, Take 3 tablets by mouth Daily., Disp: , Rfl:   •  Multiple Vitamin (MULTI-VITAMIN) tablet, Take 1 tablet by mouth Daily., Disp: , Rfl:   •  Probiotic Product (PROBIOTIC PO), Take 1 tablet by mouth Daily., Disp: , Rfl:   •  SYNTHROID 50 MCG tablet, Take 1 tablet by mouth Daily., Disp: 90 tablet, Rfl: 3  •  TURMERIC PO, Take 1 tablet by mouth Daily., Disp: , Rfl:   •  albuterol (PROVENTIL HFA;VENTOLIN HFA) 108 (90 Base) MCG/ACT inhaler, Inhale 2 puffs Every 4 (Four) Hours As Needed for Wheezing., Disp: 6.7 g, Rfl: 1  •  ALPRAZolam (XANAX) 0.5 MG tablet, Take 0.5 mg by mouth 3 (three) times a day as needed.  , Disp: , Rfl:   •  budesonide-formoterol (SYMBICORT) 160-4.5 MCG/ACT inhaler, Inhale 2 puffs 2 (Two) Times a Day. (Patient taking differently: Inhale 2 puffs As Needed.), Disp: 1 inhaler, Rfl: 3  •  dicyclomine (BENTYL) 20 MG tablet, Take 1 tablet by mouth 3 (Three) Times a Day As Needed (abd pain and cramping)., Disp: 90 tablet, Rfl: 5  •  Omega-3 Fatty Acids (OMEGA 3 PO), Take 1 tablet by mouth Daily., Disp: , Rfl:      No Known Allergies  Review of Systems  Positive for abdominal pain.  Negative for chest pain or shortness  "of breath  All other systems reviewed and negative.     Vitals       Vitals:     12/11/19 1434   Pulse: 86   SpO2: 98%   Weight: 85.8 kg (189 lb 3.2 oz)   Height: 167.6 cm (66\")            PHYSICAL EXAM:     Pulse 86   Ht 167.6 cm (66\")   Wt 85.8 kg (189 lb 3.2 oz)   SpO2 98%   BMI 30.54 kg/m²   Body mass index is 30.54 kg/m².     Constitutional: well developed, well nourished, appears  healthy, stated age  Eyes: sclera nonicteric, conjunctiva not injected   ENMT: Hearing intact, trachea midline, dentitia in good order  CVS: RRR, no murmur, peripheral edema not present  Respiratory: CTA, normal respiratory effort   Gastrointestinal: no hepatosplenomegaly, abdomen soft, tender at the iliac crest and just above that with some mild guarding no rebound, abdominal hernia not detected  Genitourinary: inguinal hernia not present  Musculoskeletal: gait normal, muscle mass normal  Skin: warm and dry, no rashes visible  Neurological: awake and alert, seems to have reasonable capacity for understanding for medical decision making  Psychiatric: appears to have reasonable judgement, pleasant  Lymphatics: no cervical adenopathy     Radiographic/Lab Findings: Hemoglobin 11.8     Pamphlet reviewed: Colon resection     IMPRESSION:  · Left lower quadrant pain with diverticulitis by CT, mid-to distal descending.   This location is problematic given the need to do a complete resection to the rectum but also to maintain blood supply so we will required extensive mobilization around the splenic flexure.  · Strong family history of diverticulitis  · Hypothyroidism on Synthroid  · Vocalize concern for adequacy of pain control..      PLAN:  · Laparoscopic low anterior resection with splenic flexure mobilization.  Discussed the need to go around the spleen and the potential of splenic injury.  Discussed the risk including a 5% risk of need for a colostomy due to a leak.  · Lorenzo for colostomy and PAT though I think there is very little " risk of that occurring during the initial surgery, certainly less than 5%  · ERAS protocol with tap block, gabapentin, Tylenol, Entereg.  · SCDs, tap block.  Preoperative antibiotics IV   · We discussed the option of not proceeding with surgery, which will lead to I think progressively worse disease which will be more difficult to resect.  · MiraLAX prep day prior with neomycin and erythromycin.     Cintia Joseph MD

## 2020-01-15 NOTE — OP NOTE
SURGERY  Colon Resection Operative Note : OPAL Kemp  1970    Procedure Date: 01/15/20    Pre-Op Diagnosis:  · Left lower quadrant pain with diverticulitis by CT, mid-to distal descending.     · Strong family history of diverticulitis with perforation     Post-op Diagnosis:  · Mid descending colon diverticulitis  · Possible distal sigmoid diverticulitis versus adhesive disease    Procedure:  · Laparoscopic low anterior colon resection  · Laparoscopic splenic flexure mobilization     Surgeon: Opal    Assistant: Gilberto    Indications:  · As above    Associated Issues:  · Hypothyroidism on Synthroid  · Vocalize concern for adequacy of pain control..     Findings:   · Mid descending colon with area of diverticulitis with obvious palpable mass.  · Rectum extraperitoneal lysed with circuitous course, requiring dissection out of the pelvis  · Sigmoid with adhesions contributing to an arrangement almost like a volvulus    Recommendations:  · Routine recuperation and recovery     Specimens:   · Partial descending colon, sigmoid colon, and partial rectum     EBL: Less than 50 ml    Technique:   Gen. anesthetic was induced, abdomen prepped with ChloraPrep and draped sterilely. Pearson catheter, arms tucked, split leg table. Transverse incision was made in the mid epigastric region after injecting with half percent Marcaine with epinephrine, followed by a 12 mm splitting trocar.  Under direct vision 3 additional trocars were placed being a 5 mm in the right paramedian in the vicinity of the umbilicus, 12 mm in the right lower quadrant, and a 5 mm in the far left lateral.    I evaluated the abdominal cavity and identified the area of disease at the mid descending colon.  Based on the proximity of the disease to the spleen, I determined that a splenic flexure mobilization would be needed.  I thus began at the pelvic brim with the hook on the cautery, divided along the white line of Toldt and rolled the colon  medially.  The ureter was immediately identified.  As I came up to about the mid descending, I encountered the area of the diverticulitis.  I had placed the clip.  Just to make sure that we could find that later for our mobilization and to ensure that the ends would reach.    Then from the right side, I went through the 2 upper trochars, and had assistant Macias come through the lower trocar and divided the omentum off the transverse colon all the way from the proximal transverse over to the splenic flexure.  I also divided the adhesions between the planes of the gastric peritoneum and the transverse colon peritoneum.  The pancreas was extremely well visualized.    At that went below and between the legs, and completed the splenic flexure mobilization, sequentially rolling the colon medially and inferiorly, taking those attachments with an Enseal.  Ultimately I dissected out the IMV.  Because I had an excellent approach from the patient's left, I went ahead and exchanged the 5 mm trocar for 12, placed a great echelon load through that and divided the IMV right at the ligament of Treitz.  I then divided the mesentery of the colon directly inferior to the pancreas, dividing that over to about the midsection of the pancreas just shy of the middle colic artery.    I then went back to the patient's right side, and divided the bare area of the colon extending down the patient's left side all the way down to the sigmoidal vessels.  Here I stapled across them with the [place] gray load.      I then began to take down the vasculature to the distal sigmoid.  I also attempted to identify the margin between the sigmoid and the rectum, and there appeared to be diverticuli almost immediately adjacent to the depth of the pelvis.  At this had assistant Macias go down and bring up the dilator, and this revealed that the rectum was extremely circuitous, well outside the peritonealized aspects of the pelvis.  I thus opened the  peritoneum in the pelvis deeply so that I could dissect out the rectum.  I then dissected further down on the sigmoid, getting below all the diverticuli.  I left a lot of the mesentery in the pelvis to take him space, dissecting right directly on the colon all the way down to the rectum.  I then stapled across the rectum with the blue load echelon transversely.  This required 1 load(s).     Small incision was made at the umbilicus, fascia opened vertically and a small Blaise wound retractor placed and the colon brought out. Blue towels were placed for cleanliness. Doppler was used to confirm good signal at the area where I had placed a clip which I felt would be able to reach the rectal stump and clearly remove the area of disease. The marginal vessel was then divided at that level, with the Enseal.  Pursestring device was placed across the colon, Look needle placed thru the device and the colon divided.    The colon seemed very pliable taking a 25 mm dilator and ultimately  a 29.   The 29 circular anvil was placed into the proximal aspect of the open colon, and tied nicely around the anvil shaft and Betadine applied. This was placed back into the abdominal cavity removing all instruments and gloves and towels.  New gloves then applied.    We then closed the midline with running 0 PDS starting at superior and inferior and tying together in the middle.  We reinsufflated and assistant Gilberto went below and brought up the staple device after bringing up the dilators, the shaft coming out anterior to the transverse line of the staple. Getting the shaft to come through was straightforward. Ultimately I was able to line up the anvil, doing so with the colon rotated to ensure that there was no binding of the mesentery.   Once approximated it was fired. The instrument was then removed.    I elected to oversew the anterior aspect with 3 0 Ethibond(s) laparoscopically.  We then pressure tested the anastomosis with a Sourav  along the left pelvic brim on the descending colon, with the rigid sigmoidoscope insufflating air without air bubbles, signifying no leak. We irrigated and suctioned make sure all was well. I ensured again that the rotation of the colon was such that the mesentery had not been injured.  All trochars removed and the three 12 mm sites closed with 0 Vicryl at the fascial level, dermis with 3-0 Vicryl and the skin with a running 5-0 Vicryl. Skin affix.    Cintia Joseph MD  01/15/20

## 2020-01-15 NOTE — ANESTHESIA PROCEDURE NOTES
Peripheral Block      Patient reassessed immediately prior to procedure    Patient location during procedure: OR  Start time: 1/15/2020 1:31 PM  Stop time: 1/15/2020 1:40 PM  Performed by  Anesthesiologist: Steven Apodaca MD  Assisted by: Cody Zuñiga MD  Preanesthetic Checklist  Completed: patient identified, site marked, surgical consent, pre-op evaluation, timeout performed, IV checked, risks and benefits discussed and monitors and equipment checked  Prep:  Sterile barriers:cap, gloves and sterile barriers  Prep: ChloraPrep  Patient monitoring: blood pressure monitoring, continuous pulse oximetry and EKG  Procedure  Sedation:yes  Performed under: general  Guidance:ultrasound guided  ULTRASOUND INTERPRETATION. Using ultrasound guidance a 21 G gauge needle was placed in close proximity to the nerve, at which point, under ultrasound guidance anesthetic was injected in the area of the nerve and spread of the anesthesia was seen on ultrasound in close proximity thereto.  There were no abnormalities seen on ultrasound; a digital image was taken; and the patient tolerated the procedure with no complications. Images:still images obtained, printed/placed on chart    Laterality:Bilateral  Block Type:TAP  Injection Technique:single-shot  Needle Type:echogenic  Needle Gauge:21 G  Loss of resistance: normal.    Medications Used: bupivacaine liposome (EXPAREL) 1.3 % injection, 20 mL  bupivacaine (PF) (MARCAINE) 0.5 % injection, 10 mL  Med admintered at 1/15/2020 1:42 PM      Post Assessment  Injection Assessment: negative aspiration for heme, no paresthesia on injection and incremental injection  Patient Tolerance:comfortable throughout block  Complications:no

## 2020-01-15 NOTE — ANESTHESIA PREPROCEDURE EVALUATION
Anesthesia Evaluation     Patient summary reviewed and Nursing notes reviewed   NPO Solid Status: > 8 hours  NPO Liquid Status: > 2 hours           Airway   Mallampati: II  TM distance: >3 FB  Neck ROM: full  no difficulty expected  Dental - normal exam     Pulmonary - normal exam   (+) asthma,  Cardiovascular - normal exam    (+) hyperlipidemia,       Neuro/Psych  (+) psychiatric history Anxiety and Depression,     GI/Hepatic/Renal/Endo    (+) obesity, morbid obesity,      Musculoskeletal     Abdominal  - normal exam   Substance History      OB/GYN          Other                        Anesthesia Plan    ASA 2     general   (Bilateral TAP blocks)  intravenous induction     Anesthetic plan, all risks, benefits, and alternatives have been provided, discussed and informed consent has been obtained with: patient.

## 2020-01-16 LAB
ANION GAP SERPL CALCULATED.3IONS-SCNC: 10.6 MMOL/L (ref 5–15)
BASOPHILS # BLD AUTO: 0.01 10*3/MM3 (ref 0–0.2)
BASOPHILS NFR BLD AUTO: 0.1 % (ref 0–1.5)
BUN BLD-MCNC: 6 MG/DL (ref 6–20)
BUN/CREAT SERPL: 9.7 (ref 7–25)
CALCIUM SPEC-SCNC: 7.7 MG/DL (ref 8.6–10.5)
CHLORIDE SERPL-SCNC: 99 MMOL/L (ref 98–107)
CO2 SERPL-SCNC: 22.4 MMOL/L (ref 22–29)
CREAT BLD-MCNC: 0.62 MG/DL (ref 0.57–1)
DEPRECATED RDW RBC AUTO: 37.5 FL (ref 37–54)
EOSINOPHIL # BLD AUTO: 0 10*3/MM3 (ref 0–0.4)
EOSINOPHIL NFR BLD AUTO: 0 % (ref 0.3–6.2)
ERYTHROCYTE [DISTWIDTH] IN BLOOD BY AUTOMATED COUNT: 11.9 % (ref 12.3–15.4)
GFR SERPL CREATININE-BSD FRML MDRD: 101 ML/MIN/1.73
GLUCOSE BLD-MCNC: 121 MG/DL (ref 65–99)
HCT VFR BLD AUTO: 29.2 % (ref 34–46.6)
HGB BLD-MCNC: 10.4 G/DL (ref 12–15.9)
IMM GRANULOCYTES # BLD AUTO: 0.08 10*3/MM3 (ref 0–0.05)
IMM GRANULOCYTES NFR BLD AUTO: 0.7 % (ref 0–0.5)
LYMPHOCYTES # BLD AUTO: 0.54 10*3/MM3 (ref 0.7–3.1)
LYMPHOCYTES NFR BLD AUTO: 4.7 % (ref 19.6–45.3)
MCH RBC QN AUTO: 31 PG (ref 26.6–33)
MCHC RBC AUTO-ENTMCNC: 35.6 G/DL (ref 31.5–35.7)
MCV RBC AUTO: 86.9 FL (ref 79–97)
MONOCYTES # BLD AUTO: 0.71 10*3/MM3 (ref 0.1–0.9)
MONOCYTES NFR BLD AUTO: 6.2 % (ref 5–12)
NEUTROPHILS # BLD AUTO: 10.2 10*3/MM3 (ref 1.7–7)
NEUTROPHILS NFR BLD AUTO: 88.3 % (ref 42.7–76)
NRBC BLD AUTO-RTO: 0 /100 WBC (ref 0–0.2)
PLATELET # BLD AUTO: 206 10*3/MM3 (ref 140–450)
PMV BLD AUTO: 10.1 FL (ref 6–12)
POTASSIUM BLD-SCNC: 4.5 MMOL/L (ref 3.5–5.2)
RBC # BLD AUTO: 3.36 10*6/MM3 (ref 3.77–5.28)
SODIUM BLD-SCNC: 132 MMOL/L (ref 136–145)
WBC NRBC COR # BLD: 11.54 10*3/MM3 (ref 3.4–10.8)

## 2020-01-16 PROCEDURE — 85025 COMPLETE CBC W/AUTO DIFF WBC: CPT | Performed by: SURGERY

## 2020-01-16 PROCEDURE — 25010000002 KETOROLAC TROMETHAMINE PER 15 MG: Performed by: SURGERY

## 2020-01-16 PROCEDURE — 80048 BASIC METABOLIC PNL TOTAL CA: CPT | Performed by: SURGERY

## 2020-01-16 PROCEDURE — 94799 UNLISTED PULMONARY SVC/PX: CPT

## 2020-01-16 PROCEDURE — 25010000003 CEFAZOLIN IN DEXTROSE 2-4 GM/100ML-% SOLUTION: Performed by: SURGERY

## 2020-01-16 PROCEDURE — 99024 POSTOP FOLLOW-UP VISIT: CPT | Performed by: SURGERY

## 2020-01-16 RX ADMIN — METRONIDAZOLE 500 MG: 500 INJECTION, SOLUTION INTRAVENOUS at 19:52

## 2020-01-16 RX ADMIN — CEFAZOLIN SODIUM 2 G: 2 INJECTION, SOLUTION INTRAVENOUS at 05:50

## 2020-01-16 RX ADMIN — KETOROLAC TROMETHAMINE 15 MG: 15 INJECTION, SOLUTION INTRAMUSCULAR; INTRAVENOUS at 14:04

## 2020-01-16 RX ADMIN — GABAPENTIN 300 MG: 300 CAPSULE ORAL at 05:50

## 2020-01-16 RX ADMIN — ALVIMOPAN 12 MG: 12 CAPSULE ORAL at 08:51

## 2020-01-16 RX ADMIN — ACETAMINOPHEN 1000 MG: 500 TABLET, FILM COATED ORAL at 07:31

## 2020-01-16 RX ADMIN — KETOROLAC TROMETHAMINE 15 MG: 15 INJECTION, SOLUTION INTRAMUSCULAR; INTRAVENOUS at 07:31

## 2020-01-16 RX ADMIN — METRONIDAZOLE 500 MG: 500 INJECTION, SOLUTION INTRAVENOUS at 04:45

## 2020-01-16 RX ADMIN — CEFAZOLIN SODIUM 2 G: 2 INJECTION, SOLUTION INTRAVENOUS at 21:48

## 2020-01-16 RX ADMIN — KETOROLAC TROMETHAMINE 15 MG: 15 INJECTION, SOLUTION INTRAMUSCULAR; INTRAVENOUS at 19:52

## 2020-01-16 RX ADMIN — LEVOTHYROXINE SODIUM 50 MCG: 50 TABLET ORAL at 07:30

## 2020-01-16 RX ADMIN — CEFAZOLIN SODIUM 2 G: 2 INJECTION, SOLUTION INTRAVENOUS at 14:04

## 2020-01-16 RX ADMIN — GABAPENTIN 300 MG: 300 CAPSULE ORAL at 21:48

## 2020-01-16 RX ADMIN — ACETAMINOPHEN 1000 MG: 500 TABLET, FILM COATED ORAL at 01:45

## 2020-01-16 RX ADMIN — ACETAMINOPHEN 1000 MG: 500 TABLET, FILM COATED ORAL at 14:04

## 2020-01-16 RX ADMIN — FAMOTIDINE 20 MG: 20 TABLET, FILM COATED ORAL at 08:51

## 2020-01-16 RX ADMIN — OXYCODONE HYDROCHLORIDE 5 MG: 5 TABLET ORAL at 05:54

## 2020-01-16 RX ADMIN — DEXTROSE MONOHYDRATE, SODIUM CHLORIDE, SODIUM LACTATE, POTASSIUM CHLORIDE, CALCIUM CHLORIDE 50 ML/HR: 5; 600; 310; 179; 20 INJECTION, SOLUTION INTRAVENOUS at 19:52

## 2020-01-16 RX ADMIN — METRONIDAZOLE 500 MG: 500 INJECTION, SOLUTION INTRAVENOUS at 12:31

## 2020-01-16 RX ADMIN — FAMOTIDINE 20 MG: 20 TABLET, FILM COATED ORAL at 21:48

## 2020-01-16 RX ADMIN — BUDESONIDE AND FORMOTEROL FUMARATE DIHYDRATE 2 PUFF: 160; 4.5 AEROSOL RESPIRATORY (INHALATION) at 19:58

## 2020-01-16 RX ADMIN — BUDESONIDE AND FORMOTEROL FUMARATE DIHYDRATE 2 PUFF: 160; 4.5 AEROSOL RESPIRATORY (INHALATION) at 08:25

## 2020-01-16 RX ADMIN — ACETAMINOPHEN 1000 MG: 500 TABLET, FILM COATED ORAL at 21:49

## 2020-01-16 RX ADMIN — GABAPENTIN 300 MG: 300 CAPSULE ORAL at 14:04

## 2020-01-16 RX ADMIN — OXYCODONE HYDROCHLORIDE 5 MG: 5 TABLET ORAL at 01:45

## 2020-01-16 NOTE — PROGRESS NOTES
"GENERAL SURGERY  Rita Kemp  1970  1 Day Post-Op    CC:  \"i'm doing well\"    HPI:  Good night.  No flatus or bowel movement, but eating.    Diet:  Full liquids     Vitals:    01/16/20 0252 01/16/20 0507 01/16/20 0826 01/16/20 0926   BP: (!) 88/52 91/58  99/57   BP Location: Left arm Left arm  Left arm   Patient Position: Lying Lying  Lying   Pulse: 66 64 64 64   Resp: 16 18 16 16   Temp: 99.1 °F (37.3 °C) 97.9 °F (36.6 °C)  97.7 °F (36.5 °C)   TempSrc: Oral Oral  Oral   SpO2: 95% 96%  95%   Weight:       Height:         Intake & Output (last day)       01/15 0701 - 01/16 0700 01/16 0701 - 01/17 0700    P.O. 120 440    I.V. (mL/kg) 1300 (15.5)     IV Piggyback 700 100    Total Intake(mL/kg) 2120 (25.3) 540 (6.5)    Urine (mL/kg/hr) 1975     Total Output 1975     Net +145 +540                Physical Exam  Abdo appropriate tenderness  Pertinent labs/imaging:  Results from last 7 days   Lab Units 01/16/20  0603   WBC 10*3/mm3 11.54*   HEMOGLOBIN g/dL 10.4*   HEMATOCRIT % 29.2*   PLATELETS 10*3/mm3 206     Results from last 7 days   Lab Units 01/16/20  0603 01/15/20  1950   SODIUM mmol/L 132* 136   POTASSIUM mmol/L 4.5 3.8   CHLORIDE mmol/L 99 98   CO2 mmol/L 22.4 21.2*   BUN mg/dL 6 7   CREATININE mg/dL 0.62 0.76   CALCIUM mg/dL 7.7* 8.4*   GLUCOSE mg/dL 121* 145*       Assessment:   · 1 Day Post-Op S/P lap low anterior with splenic flexure mobilization    Plan  · Advance diet  · Anticipate DC tomorrow.    Cintia Joseph MD  01/16/20      "

## 2020-01-16 NOTE — ANESTHESIA POSTPROCEDURE EVALUATION
Patient: Rita Kemp    Procedure Summary     Date:  01/15/20 Room / Location:  [facility] OR  /  [facility]    Anesthesia Start:  1319 Anesthesia Stop:  1716    Procedure:  laparoscopic low anterior colon resection with splenic flexure mobilization (Left Abdomen) Diagnosis:       Diverticulitis of large intestine without perforation or abscess without bleeding      (Diverticulitis of large intestine without perforation or abscess without bleeding [K57.32])    Surgeon:  Cintia Joseph MD Provider:  Ramakrishna Gilliam MD    Anesthesia Type:  general ASA Status:  2          Anesthesia Type: general    Vitals  Vitals Value Taken Time   /74 1/15/2020  6:30 PM   Temp 36.7 °C (98 °F) 1/15/2020  6:30 PM   Pulse 73 1/15/2020  6:30 PM   Resp 16 1/15/2020  6:30 PM   SpO2 98 % 1/15/2020  6:33 PM   Vitals shown include unvalidated device data.        Post Anesthesia Care and Evaluation    Patient location during evaluation: PACU  Patient participation: complete - patient participated  Level of consciousness: awake and alert  Pain management: adequate  Airway patency: patent  Anesthetic complications: No anesthetic complications  PONV Status: none  Cardiovascular status: acceptable  Respiratory status: acceptable  Hydration status: acceptable

## 2020-01-17 VITALS
OXYGEN SATURATION: 96 % | DIASTOLIC BLOOD PRESSURE: 73 MMHG | HEIGHT: 66 IN | RESPIRATION RATE: 18 BRPM | BODY MASS INDEX: 29.65 KG/M2 | HEART RATE: 57 BPM | TEMPERATURE: 96.9 F | WEIGHT: 184.5 LBS | SYSTOLIC BLOOD PRESSURE: 123 MMHG

## 2020-01-17 LAB
ANION GAP SERPL CALCULATED.3IONS-SCNC: 11.4 MMOL/L (ref 5–15)
BUN BLD-MCNC: 10 MG/DL (ref 6–20)
BUN/CREAT SERPL: 14.7 (ref 7–25)
CALCIUM SPEC-SCNC: 7.5 MG/DL (ref 8.6–10.5)
CHLORIDE SERPL-SCNC: 90 MMOL/L (ref 98–107)
CO2 SERPL-SCNC: 20.6 MMOL/L (ref 22–29)
CREAT BLD-MCNC: 0.68 MG/DL (ref 0.57–1)
GFR SERPL CREATININE-BSD FRML MDRD: 91 ML/MIN/1.73
GLUCOSE BLD-MCNC: 93 MG/DL (ref 65–99)
POTASSIUM BLD-SCNC: 3.6 MMOL/L (ref 3.5–5.2)
SODIUM BLD-SCNC: 122 MMOL/L (ref 136–145)

## 2020-01-17 PROCEDURE — 25010000003 CEFAZOLIN IN DEXTROSE 2-4 GM/100ML-% SOLUTION: Performed by: SURGERY

## 2020-01-17 PROCEDURE — 25010000002 KETOROLAC TROMETHAMINE PER 15 MG: Performed by: SURGERY

## 2020-01-17 PROCEDURE — 80048 BASIC METABOLIC PNL TOTAL CA: CPT | Performed by: SURGERY

## 2020-01-17 RX ORDER — KETOROLAC TROMETHAMINE 30 MG/ML
30 INJECTION, SOLUTION INTRAMUSCULAR; INTRAVENOUS EVERY 6 HOURS PRN
Status: DISCONTINUED | OUTPATIENT
Start: 2020-01-17 | End: 2020-01-17

## 2020-01-17 RX ORDER — ACETAMINOPHEN 500 MG
1000 TABLET ORAL EVERY 6 HOURS
Status: DISCONTINUED | OUTPATIENT
Start: 2020-01-17 | End: 2020-01-17 | Stop reason: HOSPADM

## 2020-01-17 RX ORDER — OXYCODONE HYDROCHLORIDE 5 MG/1
5 TABLET ORAL EVERY 6 HOURS PRN
Qty: 12 TABLET | Refills: 0 | Status: SHIPPED | OUTPATIENT
Start: 2020-01-17 | End: 2020-02-03

## 2020-01-17 RX ADMIN — ACETAMINOPHEN 1000 MG: 500 TABLET, FILM COATED ORAL at 10:17

## 2020-01-17 RX ADMIN — KETOROLAC TROMETHAMINE 15 MG: 15 INJECTION, SOLUTION INTRAMUSCULAR; INTRAVENOUS at 01:43

## 2020-01-17 RX ADMIN — KETOROLAC TROMETHAMINE 15 MG: 15 INJECTION, SOLUTION INTRAMUSCULAR; INTRAVENOUS at 08:15

## 2020-01-17 RX ADMIN — LEVOTHYROXINE SODIUM 50 MCG: 50 TABLET ORAL at 05:43

## 2020-01-17 RX ADMIN — METRONIDAZOLE 500 MG: 500 INJECTION, SOLUTION INTRAVENOUS at 04:36

## 2020-01-17 RX ADMIN — CEFAZOLIN SODIUM 2 G: 2 INJECTION, SOLUTION INTRAVENOUS at 05:43

## 2020-01-17 RX ADMIN — ACETAMINOPHEN 1000 MG: 500 TABLET, FILM COATED ORAL at 04:39

## 2020-01-17 RX ADMIN — FAMOTIDINE 20 MG: 20 TABLET, FILM COATED ORAL at 08:15

## 2020-01-17 RX ADMIN — GABAPENTIN 300 MG: 300 CAPSULE ORAL at 05:43

## 2020-01-17 NOTE — DISCHARGE SUMMARY
Discharge Summary    Patient name: Rita Kemp    Medical record number: [id number]    Admission date: 1/15/2020  Discharge date:  1/17/2020    Attending physician: Dr. Cintia Joseph    Primary care physician: Ayana Pennington MD    Consulting physician(s):  none    Primary Diagnoses:    · Chronic recurring diverticulitis    Secondary Diagnoses:     · hypothyroidism     Procedure/Proc Date:      · Laparoscopic low anterior colon resection: 1/15/2020    Hospital Course:   The patient is a very pleasant 49 y.o. female that was admitted to the hospital on 1/15/2020 for the aforementioned surgery.  This went uneventfully and had the expected need to resect a large portion of the colon due to the area of greatest disease being in the mid descending but also present in the sigmoid with extra peritonealization of the rectum secondary to the prior surgery.    She did well post op and is using tylenol for pain only at discharge on POD#2.    Pathology:   · pending    Discharge medications:      Your medication list      START taking these medications      Instructions Last Dose Given Next Dose Due   oxyCODONE 5 MG immediate release tablet  Commonly known as:  ROXICODONE      Take 1 tablet by mouth Every 6 (Six) Hours As Needed for Moderate Pain .          CHANGE how you take these medications      Instructions Last Dose Given Next Dose Due   budesonide-formoterol 160-4.5 MCG/ACT inhaler  Commonly known as:  SYMBICORT  What changed:    · when to take this  · reasons to take this      Inhale 2 puffs 2 (Two) Times a Day.          CONTINUE taking these medications      Instructions Last Dose Given Next Dose Due   albuterol sulfate  (90 Base) MCG/ACT inhaler  Commonly known as:  PROVENTIL HFA;VENTOLIN HFA;PROAIR HFA      Inhale 2 puffs Every 4 (Four) Hours As Needed for Wheezing.       ALPRAZolam 0.5 MG tablet  Commonly known as:  XANAX      Take 0.5 mg by mouth 3 (three) times a day as needed.       CENTRUM SILVER PO       Take 1 tablet by mouth Daily.       dicyclomine 20 MG tablet  Commonly known as:  BENTYL      Take 1 tablet by mouth 3 (Three) Times a Day As Needed (abd pain and cramping).       estradiol 2 MG tablet  Commonly known as:  ESTRACE      Take 2 mg by mouth Daily.       FIBER PO      Take 2 tablets by mouth Daily.       PROBIOTIC PO      Take 1 tablet by mouth Daily.       SYNTHROID 50 MCG tablet  Generic drug:  levothyroxine      Take 1 tablet by mouth Daily.       TURMERIC PO      Take 1 tablet by mouth Daily. PT HOLDING FOR SURGERY       VITAMIN B-12 PO      Take 1 tablet by mouth Daily.       VITAMIN C PO      Take 1 tablet by mouth Daily.       VITAMIN D PO      Take 5,000 Units by mouth Daily.          STOP taking these medications    Chlorhexidine Gluconate Cloth 2 % pads              Where to Get Your Medications      These medications were sent to [facility] - [place] [id number]    Hours:  7:00AM-6PM Mon-Fri Phone:  [phone]   · oxyCODONE 5 MG immediate release tablet         Discharge diet:  · Soft diet for one week    Recommendations:    · Follow up in the office  · DC recs on AVS        Cintia Joseph MD  Office Number: [phone].

## 2020-01-17 NOTE — PROGRESS NOTES
Discharge Planning Assessment  [facility]     Patient Name: Rita Kemp  MRN: [id number]  Today's Date: 1/17/2020    Admit Date: 1/15/2020      Discharge Plan     Row Name 01/17/20 1141       Plan    Plan Comments  Discharge order, no discharge needs identified.    Final Discharge Disposition Code  01 - home or self-care     Expected Discharge Date and Time     Expected Discharge Date Expected Discharge Time    Jan 17, 2020        Maru Tsang RN

## 2020-01-21 LAB
LAB AP CASE REPORT: NORMAL
PATH REPORT.FINAL DX SPEC: NORMAL
PATH REPORT.GROSS SPEC: NORMAL

## 2020-02-03 ENCOUNTER — OFFICE VISIT (OUTPATIENT)
Dept: SURGERY | Facility: CLINIC | Age: 50
End: 2020-02-03

## 2020-02-03 DIAGNOSIS — K57.32 DIVERTICULITIS OF LARGE INTESTINE WITHOUT PERFORATION OR ABSCESS WITHOUT BLEEDING: Primary | ICD-10-CM

## 2020-02-03 PROCEDURE — 99024 POSTOP FOLLOW-UP VISIT: CPT | Performed by: SURGERY

## 2020-02-17 RX ORDER — LEVOTHYROXINE SODIUM 50 MCG
50 TABLET ORAL DAILY
Qty: 90 TABLET | Refills: 3 | Status: SHIPPED | OUTPATIENT
Start: 2020-02-17

## 2020-02-27 DIAGNOSIS — Z00.00 HEALTH CARE MAINTENANCE: Primary | ICD-10-CM

## 2020-02-29 LAB
ALBUMIN SERPL-MCNC: 4.4 G/DL (ref 3.5–5.2)
ALBUMIN/GLOB SERPL: 1.6 G/DL
ALP SERPL-CCNC: 83 U/L (ref 39–117)
ALT SERPL-CCNC: 9 U/L (ref 1–33)
AST SERPL-CCNC: 11 U/L (ref 1–32)
BASOPHILS # BLD AUTO: 0.02 10*3/MM3 (ref 0–0.2)
BASOPHILS NFR BLD AUTO: 0.3 % (ref 0–1.5)
BILIRUB SERPL-MCNC: 0.4 MG/DL (ref 0.2–1.2)
BUN SERPL-MCNC: 8 MG/DL (ref 6–20)
BUN/CREAT SERPL: 9.6 (ref 7–25)
CALCIUM SERPL-MCNC: 9.1 MG/DL (ref 8.6–10.5)
CHLORIDE SERPL-SCNC: 100 MMOL/L (ref 98–107)
CHOLEST SERPL-MCNC: 209 MG/DL (ref 0–200)
CO2 SERPL-SCNC: 27.3 MMOL/L (ref 22–29)
CREAT SERPL-MCNC: 0.83 MG/DL (ref 0.57–1)
EOSINOPHIL # BLD AUTO: 0.12 10*3/MM3 (ref 0–0.4)
EOSINOPHIL NFR BLD AUTO: 1.9 % (ref 0.3–6.2)
ERYTHROCYTE [DISTWIDTH] IN BLOOD BY AUTOMATED COUNT: 11.9 % (ref 12.3–15.4)
GLOBULIN SER CALC-MCNC: 2.8 GM/DL
GLUCOSE SERPL-MCNC: 87 MG/DL (ref 65–99)
HCT VFR BLD AUTO: 35.3 % (ref 34–46.6)
HDLC SERPL-MCNC: 67 MG/DL (ref 40–60)
HGB BLD-MCNC: 11.9 G/DL (ref 12–15.9)
IMM GRANULOCYTES # BLD AUTO: 0.01 10*3/MM3 (ref 0–0.05)
IMM GRANULOCYTES NFR BLD AUTO: 0.2 % (ref 0–0.5)
LDLC SERPL CALC-MCNC: 95 MG/DL (ref 0–100)
LDLC/HDLC SERPL: 1.41 {RATIO}
LYMPHOCYTES # BLD AUTO: 1.56 10*3/MM3 (ref 0.7–3.1)
LYMPHOCYTES NFR BLD AUTO: 24.3 % (ref 19.6–45.3)
MCH RBC QN AUTO: 29.9 PG (ref 26.6–33)
MCHC RBC AUTO-ENTMCNC: 33.7 G/DL (ref 31.5–35.7)
MCV RBC AUTO: 88.7 FL (ref 79–97)
MONOCYTES # BLD AUTO: 0.54 10*3/MM3 (ref 0.1–0.9)
MONOCYTES NFR BLD AUTO: 8.4 % (ref 5–12)
NEUTROPHILS # BLD AUTO: 4.17 10*3/MM3 (ref 1.7–7)
NEUTROPHILS NFR BLD AUTO: 64.9 % (ref 42.7–76)
NRBC BLD AUTO-RTO: 0 /100 WBC (ref 0–0.2)
PLATELET # BLD AUTO: 208 10*3/MM3 (ref 140–450)
POTASSIUM SERPL-SCNC: 4.9 MMOL/L (ref 3.5–5.2)
PROT SERPL-MCNC: 7.2 G/DL (ref 6–8.5)
RBC # BLD AUTO: 3.98 10*6/MM3 (ref 3.77–5.28)
SODIUM SERPL-SCNC: 139 MMOL/L (ref 136–145)
TRIGL SERPL-MCNC: 236 MG/DL (ref 0–150)
TSH SERPL DL<=0.005 MIU/L-ACNC: 2.4 UIU/ML (ref 0.27–4.2)
VLDLC SERPL CALC-MCNC: 47.2 MG/DL
WBC # BLD AUTO: 6.42 10*3/MM3 (ref 3.4–10.8)

## 2020-03-06 ENCOUNTER — OFFICE VISIT (OUTPATIENT)
Dept: INTERNAL MEDICINE | Facility: CLINIC | Age: 50
End: 2020-03-06

## 2020-03-06 VITALS
WEIGHT: 188 LBS | DIASTOLIC BLOOD PRESSURE: 82 MMHG | HEART RATE: 68 BPM | HEIGHT: 66 IN | TEMPERATURE: 98 F | BODY MASS INDEX: 30.22 KG/M2 | OXYGEN SATURATION: 99 % | SYSTOLIC BLOOD PRESSURE: 124 MMHG

## 2020-03-06 DIAGNOSIS — E78.5 HYPERLIPIDEMIA, UNSPECIFIED HYPERLIPIDEMIA TYPE: ICD-10-CM

## 2020-03-06 DIAGNOSIS — F41.9 ANXIETY: ICD-10-CM

## 2020-03-06 DIAGNOSIS — Z00.00 HEALTH CARE MAINTENANCE: Primary | ICD-10-CM

## 2020-03-06 PROCEDURE — 99396 PREV VISIT EST AGE 40-64: CPT | Performed by: INTERNAL MEDICINE

## 2020-03-06 RX ORDER — FLUCONAZOLE 150 MG/1
150 TABLET ORAL ONCE
Qty: 1 TABLET | Refills: 1 | Status: SHIPPED | OUTPATIENT
Start: 2020-03-06 | End: 2020-03-06

## 2020-03-06 RX ORDER — ALPRAZOLAM 0.5 MG/1
0.5 TABLET ORAL 3 TIMES DAILY PRN
Qty: 20 TABLET | Refills: 0 | Status: SHIPPED | OUTPATIENT
Start: 2020-03-06 | End: 2020-06-24 | Stop reason: SDUPTHER

## 2020-03-06 NOTE — PROGRESS NOTES
Subjective   Rita Kemp is a 49 y.o. female and is here for a comprehensive physical exam. The patient reports problems - anxiety.  She does have some anxiety with travel and needs a refill  She recently had partial colectomy for recurrent diverticulitis  She is feeling very good  Pt has been doing well with thyroid meds.  Taking as perscribed without any complications    Pt is UTD with annual gyn exam and mammo    Do you take any herbs or supplements that were not prescribed by a doctor? She is doing well with sx      Social History: she is doing well with current  Social History     Socioeconomic History   • Marital status:      Spouse name: Suhas Kemp   • Number of children: 0   • Years of education: Not on file   • Highest education level: Not on file   Occupational History   • Occupation:  HR   Tobacco Use   • Smoking status: Never Smoker   • Smokeless tobacco: Never Used   Substance and Sexual Activity   • Alcohol use: Yes     Alcohol/week: 2.0 standard drinks     Types: 2 Standard drinks or equivalent per week     Comment: Vodka & Tonic on the weekends   • Drug use: No   • Sexual activity: Defer       Family History:   Family History   Problem Relation Age of Onset   • Coronary artery disease Mother    • Macular degeneration Mother    • Thyroid disease Mother    • Heart disease Mother    • Skin cancer Mother    • Coronary artery disease Father    • Hypertension Father    • Diabetes type II Father    • Heart disease Father    • Colon polyps Father            • Diabetes Maternal Grandmother    • Skin cancer Sister    • Alcohol abuse Sister         both sisters with hx of alcohol abuse   • Emphysema Brother            • Alcohol abuse Brother    • Alcohol abuse Sister    • Alcohol abuse Brother          due to alcohol   • Malig Hyperthermia Neg Hx        Past Medical History:   Past Medical History:   Diagnosis Date   • Anxiety and depression    • Asthma    •  "Diverticulitis of colon 8- July and September 2019   • History of normal mammogram 07/2015   • Hypothyroidism            Review of Systems    A comprehensive review of systems was negative.    Objective   /82 (BP Location: Left arm, Patient Position: Sitting, Cuff Size: Adult)   Pulse 68   Temp 98 °F (36.7 °C) (Oral)   Ht 167.6 cm (66\")   Wt 85.3 kg (188 lb)   SpO2 99%   BMI 30.34 kg/m²     General Appearance:    Alert, cooperative, no distress, appears stated age   Head:    Normocephalic, without obvious abnormality, atraumatic   Eyes:    PERRL, conjunctiva/corneas clear, EOM's intact, fundi     benign, both eyes   Ears:    Normal TM's and external ear canals, both ears   Nose:   Nares normal, septum midline, mucosa normal, no drainage    or sinus tenderness   Throat:   Lips, mucosa, and tongue normal; teeth and gums normal   Neck:   Supple, symmetrical, trachea midline, no adenopathy;     thyroid:  no enlargement/tenderness/nodules; no carotid    bruit or JVD   Back:     Symmetric, no curvature, ROM normal, no CVA tenderness   Lungs:     Clear to auscultation bilaterally, respirations unlabored   Chest Wall:    No tenderness or deformity    Heart:    Regular rate and rhythm, S1 and S2 normal, no murmur, rub   or gallop   Breast Exam:    No tenderness, masses, or nipple abnormality   Abdomen:     Soft, non-tender, bowel sounds active all four quadrants,     no masses, no organomegaly           Extremities:   Extremities normal, atraumatic, no cyanosis or edema   Pulses:   2+ and symmetric all extremities   Skin:   Skin color, texture, turgor normal, no rashes or lesions   Lymph nodes:   Cervical, supraclavicular, and axillary nodes normal   Neurologic:   CNII-XII intact, normal strength, sensation and reflexes     throughout       Vitals:    03/06/20 0828   BP: 124/82   Pulse: 68   Temp: 98 °F (36.7 °C)   SpO2: 99%     Body mass index is 30.34 kg/m².      Medications:   Current Outpatient " Medications:   •  albuterol (PROVENTIL HFA;VENTOLIN HFA) 108 (90 Base) MCG/ACT inhaler, Inhale 2 puffs Every 4 (Four) Hours As Needed for Wheezing., Disp: 6.7 g, Rfl: 1  •  Ascorbic Acid (VITAMIN C PO), Take 1 tablet by mouth Daily., Disp: , Rfl:   •  budesonide-formoterol (SYMBICORT) 160-4.5 MCG/ACT inhaler, Inhale 2 puffs 2 (Two) Times a Day. (Patient taking differently: Inhale 2 puffs As Needed.), Disp: 1 inhaler, Rfl: 3  •  Cholecalciferol (VITAMIN D PO), Take 5,000 Units by mouth Daily., Disp: , Rfl:   •  Cyanocobalamin (VITAMIN B-12 PO), Take 1 tablet by mouth Daily., Disp: , Rfl:   •  estradiol (ESTRACE) 2 MG tablet, Take 2 mg by mouth Daily., Disp: , Rfl: 12  •  FIBER PO, Take 2 tablets by mouth Daily., Disp: , Rfl:   •  Multiple Vitamins-Minerals (CENTRUM SILVER PO), Take 1 tablet by mouth Daily., Disp: , Rfl:   •  Probiotic Product (PROBIOTIC PO), Take 1 tablet by mouth Daily., Disp: , Rfl:   •  SYNTHROID 50 MCG tablet, TAKE 1 TABLET BY MOUTH DAILY, Disp: 90 tablet, Rfl: 3  •  ALPRAZolam (XANAX) 0.5 MG tablet, Take 1 tablet by mouth 3 (Three) Times a Day As Needed for Anxiety., Disp: 20 tablet, Rfl: 0       Assessment/Plan   Healthy female exam.     1. Healthcare Maintenance:  2. Patient Counseling:  --Nutrition: Stressed importance of moderation in sodium/caffeine intake, saturated fat and cholesterol, caloric balance, sufficient intake of fresh fruits, vegetables, fiber, calcium and vit D  --Exercise: she is doing well with diet  --Substance Abuse: no tob occas etoh  --Dental health: she does go to the dentist reg  --Immunizations reviewed.rec shingles vaccine  --Discussed benefits of screening colonoscopy.utd  3.  Hypothyroidism-ok with current meds  4.  Anxiety-  She does need xanax with travel   5.  Diverticulitis-  She is doing well after sx  BM are now normal

## 2020-06-24 DIAGNOSIS — F41.9 ANXIETY: ICD-10-CM

## 2020-06-25 RX ORDER — ALPRAZOLAM 0.5 MG/1
0.5 TABLET ORAL 3 TIMES DAILY PRN
Qty: 20 TABLET | Refills: 0 | Status: SHIPPED | OUTPATIENT
Start: 2020-06-25

## 2022-04-21 NOTE — PROGRESS NOTES
Gave report to Marely   Subjective   Rita Kemp is a 49 y.o. female. Patient is here today for   Chief Complaint   Patient presents with   • Diverticulitis     Pt has history of diverticulitis in 2014. Pt complains of having LUQ pain since Saturday.    .    History of Present Illness   Patient is here with c/o LUQ abdominal pain x 2 days. No changes in bowel movements. She has had a decreased appetite. Constant, dull pain.   She had eaten some popcorn this past weekend.   She has a history of diverticulitis. She had a colonoscopy in 2018 which showed diverticulosis.     The following portions of the patient's history were reviewed and updated as appropriate: allergies, current medications, past family history, past medical history, past social history, past surgical history and problem list.    Review of Systems   Constitutional: Negative.    Respiratory: Negative.    Cardiovascular: Negative.    Gastrointestinal: Positive for abdominal distention and abdominal pain. Negative for blood in stool and constipation.       Objective   Vitals:    07/08/19 1034   BP: (!) 86/56   Pulse: 72   SpO2: 98%     Physical Exam   Constitutional: Vital signs are normal. She appears well-developed and well-nourished.   Cardiovascular: Normal rate, regular rhythm and normal heart sounds.   Pulmonary/Chest: Effort normal and breath sounds normal.   Abdominal: Soft. Normal appearance and bowel sounds are normal. There is tenderness in the left lower quadrant.   Skin: Skin is warm, dry and intact.   Psychiatric: She has a normal mood and affect. Her speech is normal and behavior is normal. Thought content normal.   Nursing note and vitals reviewed.      Assessment/Plan   Rita was seen today for diverticulitis.    Diagnoses and all orders for this visit:    Diverticulitis of intestine without perforation or abscess without bleeding, unspecified part of intestinal tract  -     CT Abdomen Pelvis With Contrast  -     ciprofloxacin (CIPRO) 500 MG tablet; Take 1  tablet by mouth 2 (Two) Times a Day.  -     metroNIDAZOLE (FLAGYL) 500 MG tablet; Take 1 tablet by mouth 3 (Three) Times a Day.  -     HYDROcodone-acetaminophen (NORCO) 7.5-325 MG per tablet; Take 1 tablet by mouth Every 8 (Eight) Hours As Needed for Severe Pain .      Stat CT abd and pelvis showed diverticulitis. Treatment as noted above

## 2022-12-27 NOTE — PLAN OF CARE
Problem: Patient Care Overview  Goal: Plan of Care Review  Outcome: Ongoing (interventions implemented as appropriate)  Flowsheets (Taken 1/16/2020 0826)  Progress: improving  Plan of Care Reviewed With: patient  Outcome Summary: abdomen soft, lap sites intact with dermabond, advanced diet to GI soft low residue diet, tolerating well without nausea, iv saline locked, iv abx given as ordered, voiding well after martinez removed, had 2 large loose bm's today, sat in chair and ambulated in soliz x5     
  Problem: Patient Care Overview  Goal: Plan of Care Review  Outcome: Ongoing (interventions implemented as appropriate)  Flowsheets (Taken 1/16/2020 7048)  Progress: improving  Plan of Care Reviewed With: patient; family  Outcome Summary: admitted from PACU alert, oriented with mild- moderate post op pain, medicated and coping well, incisions CDI,  martinez intact with good urine output, will D/C in am, BP runs a little low, no dizziness noted, ambulated, kept on clears, on Entereg, for further care     
  Problem: Patient Care Overview  Goal: Plan of Care Review  Outcome: Ongoing (interventions implemented as appropriate)  Flowsheets (Taken 1/17/2020 1689)  Progress: improving  Plan of Care Reviewed With: patient; family  Outcome Summary: slept fairly, adequare pain control on ERAS.no BM during the night had on looes too, voiding, incisions CDI, passed flatus no BM     Problem: Patient Care Overview  Goal: Individualization and Mutuality  Outcome: Ongoing (interventions implemented as appropriate)     
Ambulatory

## (undated) DEVICE — PREP SOL POVIDONE/IODINE BT 4OZ

## (undated) DEVICE — LOU LAP SIGMOID COLON: Brand: MEDLINE INDUSTRIES, INC.

## (undated) DEVICE — VISUALIZATION SYSTEM: Brand: CLEARIFY

## (undated) DEVICE — ANTIBACTERIAL UNDYED BRAIDED (POLYGLACTIN 910), SYNTHETIC ABSORBABLE SUTURE: Brand: COATED VICRYL

## (undated) DEVICE — ELECTRD BLD EDGE/INSUL1P 2.4X5.1MM STRL

## (undated) DEVICE — ENDOPATH PNEUMONEEDLE INSUFFLATION NEEDLES WITH LUER LOCK CONNECTORS 120MM: Brand: ENDOPATH

## (undated) DEVICE — TTL1LYR 16FR10ML 100%SIL TMPST TR: Brand: MEDLINE

## (undated) DEVICE — GLV SURG BIOGEL LTX PF 6 1/2

## (undated) DEVICE — SUT PDS 0 CT1 36IN Z346H

## (undated) DEVICE — GOWN,NON-REINFORCED,SIRUS,SET IN SLV,XL: Brand: MEDLINE

## (undated) DEVICE — ECHELON FLEX POWERED PLUS ARTICULATING ENDOSCOPIC LINEAR CUTTER , 60MM: Brand: ECHELON FLEX

## (undated) DEVICE — WOUND RETRACTOR AND PROTECTOR: Brand: ALEXIS WOUND PROTECTOR-RETRACTOR

## (undated) DEVICE — APPL CHLORAPREP W/TINT 26ML ORNG

## (undated) DEVICE — PENCL E/S HNDSWCH ROCKR CB

## (undated) DEVICE — SUT VIC 5/0 PS2 18IN J495H

## (undated) DEVICE — Device: Brand: DEFENDO AIR/WATER/SUCTION AND BIOPSY VALVE

## (undated) DEVICE — DISPOSABLE GRASPER CARTRIDGE: Brand: DIRECT DRIVE REPOSABLE GRASPERS

## (undated) DEVICE — ENSEAL TRIO TEMPERATURE CONTOLLED TISSUE SEALING TECHNOLOGY DISPOSABLE TISSUE SEALING DEVICE TAPTRONIC TRIGGER ACTIVATED POWER 3MM CURVED JAW: Brand: ENSEAL

## (undated) DEVICE — TUBING, SUCTION, 1/4" X 10', STRAIGHT: Brand: MEDLINE

## (undated) DEVICE — ENDOPATH XCEL BLADELESS TROCARS WITH STABILITY SLEEVES: Brand: ENDOPATH XCEL

## (undated) DEVICE — ENDOPATH XCEL UNIVERSAL TROCAR STABLILITY SLEEVES: Brand: ENDOPATH XCEL

## (undated) DEVICE — SUT VIC 2/0 TIES 18IN J111T

## (undated) DEVICE — KT VLV BIOGUARD SXN BIOP AIR/H20 CONN 4PC DISP

## (undated) DEVICE — SNAR POLYP SENSATION STDOVL 27 240 BX40

## (undated) DEVICE — STPLR SKIN VISISTAT WD 35CT

## (undated) DEVICE — 2S 2-0 BK MONO NYL 30"/75CM: Brand: 2S 2-0 BK MONO NYL 30"/75CM

## (undated) DEVICE — SOL NACL 0.9PCT 1000ML

## (undated) DEVICE — THE TORRENT IRRIGATION SCOPE CONNECTOR IS USED WITH THE TORRENT IRRIGATION TUBING TO PROVIDE IRRIGATION FLUIDS SUCH AS STERILE WATER DURING GASTROINTESTINAL ENDOSCOPIC PROCEDURES WHEN USED IN CONJUNCTION WITH AN IRRIGATION PUMP (OR ELECTROSURGICAL UNIT).: Brand: TORRENT

## (undated) DEVICE — ENDOCUT SCISSOR TIP, DISPOSABLE: Brand: RENEW

## (undated) DEVICE — CANN NASL CO2 TRULINK W/O2 A/

## (undated) DEVICE — LAPAROSCOPIC SMOKE ELIMINATION DEVICE: Brand: PNEUVIEW XE

## (undated) DEVICE — THE SINGLE USE ETRAP – POLYP TRAP IS USED FOR SUCTION RETRIEVAL OF ENDOSCOPICALLY REMOVED POLYPS.: Brand: ETRAP

## (undated) DEVICE — SENSR O2 OXIMAX FNGR A/ 18IN NONSTR

## (undated) DEVICE — SUT VIC 3/0 CT2 27IN J232H

## (undated) DEVICE — SUT VIC 0 TN 27IN DYED JTN0G